# Patient Record
Sex: FEMALE | Race: WHITE | Employment: FULL TIME | URBAN - METROPOLITAN AREA
[De-identification: names, ages, dates, MRNs, and addresses within clinical notes are randomized per-mention and may not be internally consistent; named-entity substitution may affect disease eponyms.]

---

## 2018-11-21 ENCOUNTER — OFFICE VISIT (OUTPATIENT)
Dept: RHEUMATOLOGY | Facility: CLINIC | Age: 23
End: 2018-11-21
Payer: COMMERCIAL

## 2018-11-21 VITALS
DIASTOLIC BLOOD PRESSURE: 93 MMHG | WEIGHT: 199.63 LBS | BODY MASS INDEX: 34.08 KG/M2 | HEIGHT: 64 IN | HEART RATE: 82 BPM | SYSTOLIC BLOOD PRESSURE: 139 MMHG

## 2018-11-21 DIAGNOSIS — M25.50 POLYARTHRALGIA: Primary | ICD-10-CM

## 2018-11-21 PROCEDURE — 99212 OFFICE O/P EST SF 10 MIN: CPT | Performed by: INTERNAL MEDICINE

## 2018-11-21 PROCEDURE — 99244 OFF/OP CNSLTJ NEW/EST MOD 40: CPT | Performed by: INTERNAL MEDICINE

## 2018-11-21 RX ORDER — ETHYNODIOL DIACETATE AND ETHINYL ESTRADIOL 1 MG-35MCG
1 KIT ORAL DAILY
Refills: 1 | COMMUNITY
Start: 2018-09-11

## 2018-11-21 NOTE — PROGRESS NOTES
Juan Luis Marino is a 26year old female who presents for Patient presents with:  Fibromyalgia Syndrome  Joint Pain: knees, hips, elbows, shoulders, hands  Swelling: swelling of hands in the morning  .    HPI:     I had the pleasure of seeing Juan Luis Marino on 11 pain and r/o RA which was r/o. Blood work was negative. Treated with cymbalta 30 mg daily for 1 year initially felt some improvement with sleep and energy but then stopped working. Take advil and tylenol as needed now.      Denies any fever chills, weight l lesions. No lymphadenopathy. No facial rash  CVS: RRR, no murmurs rubs or gallops. Equal 2+ distal pulses. LUNGS: CTAB, no increased work of breathing  ABDOMEN:  soft NT/ND, +BS, no HSM  SKIN: No rashes or skin lesions.  No nail findings  MSK:  Cervical s follow-up in 2 weeks    Ariela Weston MD  11/21/2018  5:39 PM

## 2019-05-08 PROBLEM — M25.551 RIGHT HIP PAIN: Status: ACTIVE | Noted: 2019-05-08

## 2019-08-31 PROCEDURE — 80307 DRUG TEST PRSMV CHEM ANLYZR: CPT | Performed by: INTERNAL MEDICINE

## 2019-08-31 PROCEDURE — 86480 TB TEST CELL IMMUN MEASURE: CPT | Performed by: INTERNAL MEDICINE

## 2020-08-07 PROBLEM — M79.7 FIBROMYALGIA: Status: ACTIVE | Noted: 2020-08-07

## 2020-08-07 PROBLEM — E55.9 VITAMIN D DEFICIENCY: Status: ACTIVE | Noted: 2020-08-07

## 2021-06-18 ENCOUNTER — HOSPITAL ENCOUNTER (OUTPATIENT)
Dept: GENERAL RADIOLOGY | Facility: HOSPITAL | Age: 26
Discharge: HOME | End: 2021-06-18
Attending: PHYSICIAN ASSISTANT
Payer: COMMERCIAL

## 2021-06-18 DIAGNOSIS — M25.551 RIGHT HIP PAIN: ICD-10-CM

## 2021-06-18 PROCEDURE — 73502 X-RAY EXAM HIP UNI 2-3 VIEWS: CPT | Performed by: PHYSICIAN ASSISTANT

## 2022-12-20 ENCOUNTER — LAB ENCOUNTER (OUTPATIENT)
Dept: LAB | Age: 27
End: 2022-12-20
Attending: INTERNAL MEDICINE
Payer: COMMERCIAL

## 2022-12-20 ENCOUNTER — OFFICE VISIT (OUTPATIENT)
Dept: INTERNAL MEDICINE CLINIC | Facility: CLINIC | Age: 27
End: 2022-12-20
Payer: COMMERCIAL

## 2022-12-20 VITALS
DIASTOLIC BLOOD PRESSURE: 84 MMHG | SYSTOLIC BLOOD PRESSURE: 131 MMHG | HEART RATE: 91 BPM | BODY MASS INDEX: 32.44 KG/M2 | HEIGHT: 64 IN | WEIGHT: 190 LBS

## 2022-12-20 DIAGNOSIS — E55.9 VITAMIN D DEFICIENCY: ICD-10-CM

## 2022-12-20 DIAGNOSIS — Z13.0 SCREENING FOR DEFICIENCY ANEMIA: ICD-10-CM

## 2022-12-20 DIAGNOSIS — Z13.220 LIPID SCREENING: ICD-10-CM

## 2022-12-20 DIAGNOSIS — K22.4 ESOPHAGEAL SPASM: ICD-10-CM

## 2022-12-20 DIAGNOSIS — Z00.00 WELLNESS EXAMINATION: Primary | ICD-10-CM

## 2022-12-20 DIAGNOSIS — Z13.29 THYROID DISORDER SCREEN: ICD-10-CM

## 2022-12-20 DIAGNOSIS — Z12.4 CERVICAL CANCER SCREENING: ICD-10-CM

## 2022-12-20 DIAGNOSIS — R73.09 ELEVATED GLUCOSE: ICD-10-CM

## 2022-12-20 DIAGNOSIS — Z13.21 ENCOUNTER FOR VITAMIN DEFICIENCY SCREENING: ICD-10-CM

## 2022-12-20 DIAGNOSIS — N92.0 SPOTTING: ICD-10-CM

## 2022-12-20 LAB
ALBUMIN SERPL-MCNC: 3.6 G/DL (ref 3.4–5)
ALBUMIN/GLOB SERPL: 1 {RATIO} (ref 1–2)
ALP LIVER SERPL-CCNC: 42 U/L
ALT SERPL-CCNC: 19 U/L
ANION GAP SERPL CALC-SCNC: 6 MMOL/L (ref 0–18)
APTT PPP: 26.5 SECONDS (ref 23.3–35.6)
AST SERPL-CCNC: 8 U/L (ref 15–37)
BASOPHILS # BLD AUTO: 0.06 X10(3) UL (ref 0–0.2)
BASOPHILS NFR BLD AUTO: 1 %
BILIRUB SERPL-MCNC: 1 MG/DL (ref 0.1–2)
BUN BLD-MCNC: 11 MG/DL (ref 7–18)
BUN/CREAT SERPL: 13.3 (ref 10–20)
CALCIUM BLD-MCNC: 9 MG/DL (ref 8.5–10.1)
CHLORIDE SERPL-SCNC: 109 MMOL/L (ref 98–112)
CHOLEST SERPL-MCNC: 174 MG/DL (ref ?–200)
CO2 SERPL-SCNC: 26 MMOL/L (ref 21–32)
CREAT BLD-MCNC: 0.83 MG/DL
DEPRECATED RDW RBC AUTO: 38.7 FL (ref 35.1–46.3)
EOSINOPHIL # BLD AUTO: 0.07 X10(3) UL (ref 0–0.7)
EOSINOPHIL NFR BLD AUTO: 1.2 %
ERYTHROCYTE [DISTWIDTH] IN BLOOD BY AUTOMATED COUNT: 11.5 % (ref 11–15)
EST. AVERAGE GLUCOSE BLD GHB EST-MCNC: 91 MG/DL (ref 68–126)
FASTING PATIENT LIPID ANSWER: YES
FASTING STATUS PATIENT QL REPORTED: YES
GFR SERPLBLD BASED ON 1.73 SQ M-ARVRAT: 100 ML/MIN/1.73M2 (ref 60–?)
GLOBULIN PLAS-MCNC: 3.5 G/DL (ref 2.8–4.4)
GLUCOSE BLD-MCNC: 88 MG/DL (ref 70–99)
HBA1C MFR BLD: 4.8 % (ref ?–5.7)
HCT VFR BLD AUTO: 43.1 %
HCV AB SERPL QL IA: NONREACTIVE
HDLC SERPL-MCNC: 51 MG/DL (ref 40–59)
HGB BLD-MCNC: 14.6 G/DL
IMM GRANULOCYTES # BLD AUTO: 0.02 X10(3) UL (ref 0–1)
IMM GRANULOCYTES NFR BLD: 0.3 %
INR BLD: 0.99 (ref 0.85–1.16)
LDLC SERPL CALC-MCNC: 97 MG/DL (ref ?–100)
LYMPHOCYTES # BLD AUTO: 1.89 X10(3) UL (ref 1–4)
LYMPHOCYTES NFR BLD AUTO: 31.1 %
MCH RBC QN AUTO: 31.2 PG (ref 26–34)
MCHC RBC AUTO-ENTMCNC: 33.9 G/DL (ref 31–37)
MCV RBC AUTO: 92.1 FL
MONOCYTES # BLD AUTO: 0.39 X10(3) UL (ref 0.1–1)
MONOCYTES NFR BLD AUTO: 6.4 %
NEUTROPHILS # BLD AUTO: 3.65 X10 (3) UL (ref 1.5–7.7)
NEUTROPHILS # BLD AUTO: 3.65 X10(3) UL (ref 1.5–7.7)
NEUTROPHILS NFR BLD AUTO: 60 %
NONHDLC SERPL-MCNC: 123 MG/DL (ref ?–130)
OSMOLALITY SERPL CALC.SUM OF ELEC: 291 MOSM/KG (ref 275–295)
PLATELET # BLD AUTO: 251 10(3)UL (ref 150–450)
POTASSIUM SERPL-SCNC: 3.8 MMOL/L (ref 3.5–5.1)
PROT SERPL-MCNC: 7.1 G/DL (ref 6.4–8.2)
PROTHROMBIN TIME: 13 SECONDS (ref 11.6–14.8)
RBC # BLD AUTO: 4.68 X10(6)UL
SODIUM SERPL-SCNC: 141 MMOL/L (ref 136–145)
TRIGL SERPL-MCNC: 152 MG/DL (ref 30–149)
TSI SER-ACNC: 1.74 MIU/ML (ref 0.36–3.74)
VIT D+METAB SERPL-MCNC: 42.4 NG/ML (ref 30–100)
VLDLC SERPL CALC-MCNC: 25 MG/DL (ref 0–30)
WBC # BLD AUTO: 6.1 X10(3) UL (ref 4–11)

## 2022-12-20 PROCEDURE — 3079F DIAST BP 80-89 MM HG: CPT | Performed by: INTERNAL MEDICINE

## 2022-12-20 PROCEDURE — 36415 COLL VENOUS BLD VENIPUNCTURE: CPT | Performed by: INTERNAL MEDICINE

## 2022-12-20 PROCEDURE — 86803 HEPATITIS C AB TEST: CPT | Performed by: INTERNAL MEDICINE

## 2022-12-20 PROCEDURE — 80053 COMPREHEN METABOLIC PANEL: CPT | Performed by: INTERNAL MEDICINE

## 2022-12-20 PROCEDURE — 3075F SYST BP GE 130 - 139MM HG: CPT | Performed by: INTERNAL MEDICINE

## 2022-12-20 PROCEDURE — 3008F BODY MASS INDEX DOCD: CPT | Performed by: INTERNAL MEDICINE

## 2022-12-20 PROCEDURE — 84443 ASSAY THYROID STIM HORMONE: CPT | Performed by: INTERNAL MEDICINE

## 2022-12-20 PROCEDURE — 83036 HEMOGLOBIN GLYCOSYLATED A1C: CPT | Performed by: INTERNAL MEDICINE

## 2022-12-20 PROCEDURE — 80061 LIPID PANEL: CPT | Performed by: INTERNAL MEDICINE

## 2022-12-20 PROCEDURE — 99214 OFFICE O/P EST MOD 30 MIN: CPT | Performed by: INTERNAL MEDICINE

## 2022-12-20 PROCEDURE — 82306 VITAMIN D 25 HYDROXY: CPT | Performed by: INTERNAL MEDICINE

## 2022-12-20 PROCEDURE — 99385 PREV VISIT NEW AGE 18-39: CPT | Performed by: INTERNAL MEDICINE

## 2022-12-20 PROCEDURE — 85025 COMPLETE CBC W/AUTO DIFF WBC: CPT | Performed by: INTERNAL MEDICINE

## 2022-12-20 PROCEDURE — 85610 PROTHROMBIN TIME: CPT | Performed by: INTERNAL MEDICINE

## 2022-12-20 PROCEDURE — 85730 THROMBOPLASTIN TIME PARTIAL: CPT | Performed by: INTERNAL MEDICINE

## 2022-12-20 RX ORDER — NITROGLYCERIN 0.4 MG/1
0.4 TABLET SUBLINGUAL EVERY 5 MIN PRN
Qty: 30 TABLET | Refills: 1 | Status: SHIPPED | OUTPATIENT
Start: 2022-12-20

## 2023-01-13 RX ORDER — ETHYNODIOL DIACETATE AND ETHINYL ESTRADIOL 1 MG-35MCG
1 KIT ORAL DAILY
Qty: 28 TABLET | Refills: 1 | Status: SHIPPED | OUTPATIENT
Start: 2023-01-13

## 2023-03-10 NOTE — TELEPHONE ENCOUNTER
Please review. Protocol failed / No protocol. Requested Prescriptions   Pending Prescriptions Disp Refills    ETHYNODIOL DIAC-ETH ESTRADIOL 1-35 MG-MCG Oral Tab [Pharmacy Med Name: Gay Rocha EST 1/35 TABLETS 28S] 28 tablet 1     Sig: Take 1 tablet by mouth daily.        Gynecology Medication Protocol Failed - 3/9/2023 10:26 PM        Failed - Pass dependent on manual look-up of last PAP and patient compliance with PAP follow up recommendations        Passed - In person appointment or virtual visit in the past 12 mos or appointment in next 3 mos     Recent Outpatient Visits              2 months ago Wellness examination    La Solano MD    Office Visit    11 months ago Acute bacterial sinusitis    Internal Medicine - Cecelia Epps MD    Office Visit    1 year ago Left hip impingement syndrome    Sharla Loza MD    Office Visit    1 year ago Left hip impingement syndrome    Orthopaedics - Sara Dubois MD    Office Visit    1 year ago Costochondritis    Internal Medicine - Silas Don MD    Office Visit          Future Appointments         Provider Department Appt Notes    In 2 months Lissa Krueger MD [address] [facility],[address], [address] [place] [address], [address] [place] [address] Birth control                    Recent Outpatient Visits              2 months ago Wellness examination    La Solano MD    Office Visit    11 months ago Acute bacterial sinusitis    Internal Medicine - Erin Nelson MD    Office Visit    1 year ago Left hip impingement syndrome    Lizette Santos, Marsha Rodriguez MD    Office Visit    1 year ago Left hip impingement syndrome    [facility] - Sara Dubois MD    Office Visit    1 year ago Costochondritis    Internal Medicine - Smiley Carballo MD    Office Visit            Future Appointments         Provider Department Appt Notes    In 2 months [place] MD Fred[facility] [address], [facility], [address] [place] [address] [facility]

## 2023-03-14 RX ORDER — ETHYNODIOL DIACETATE AND ETHINYL ESTRADIOL 1 MG-35MCG
1 KIT ORAL DAILY
Qty: 90 TABLET | Refills: 3 | Status: SHIPPED
Start: 2023-03-14

## 2023-04-06 ENCOUNTER — TELEPHONE (OUTPATIENT)
Dept: INTERNAL MEDICINE CLINIC | Facility: CLINIC | Age: 28
End: 2023-04-06

## 2023-06-05 ENCOUNTER — NURSE TRIAGE (OUTPATIENT)
Dept: INTERNAL MEDICINE CLINIC | Facility: CLINIC | Age: 28
End: 2023-06-05

## 2023-06-05 ENCOUNTER — HOSPITAL ENCOUNTER (EMERGENCY)
Age: 28
Discharge: HOME | End: 2023-06-05
Attending: EMERGENCY MEDICINE

## 2023-06-05 ENCOUNTER — APPOINTMENT (OUTPATIENT)
Dept: GENERAL RADIOLOGY | Age: 28
End: 2023-06-05
Attending: PHYSICIAN ASSISTANT

## 2023-06-05 VITALS
SYSTOLIC BLOOD PRESSURE: 133 MMHG | RESPIRATION RATE: 14 BRPM | TEMPERATURE: 99.1 F | DIASTOLIC BLOOD PRESSURE: 95 MMHG | OXYGEN SATURATION: 98 % | BODY MASS INDEX: 32.62 KG/M2 | WEIGHT: 195.77 LBS | HEIGHT: 65 IN | HEART RATE: 66 BPM

## 2023-06-05 DIAGNOSIS — R07.9 CHEST PAIN, UNSPECIFIED TYPE: Primary | ICD-10-CM

## 2023-06-05 LAB
ALBUMIN SERPL-MCNC: 3.7 G/DL (ref 3.6–5.1)
ALBUMIN/GLOB SERPL: 1.1 {RATIO} (ref 1–2.4)
ALP SERPL-CCNC: 52 UNITS/L (ref 45–117)
ALT SERPL-CCNC: 29 UNITS/L
ANION GAP SERPL CALC-SCNC: 9 MMOL/L (ref 7–19)
AST SERPL-CCNC: 16 UNITS/L
BASOPHILS # BLD: 0.1 K/MCL (ref 0–0.3)
BASOPHILS NFR BLD: 1 %
BILIRUB SERPL-MCNC: 0.7 MG/DL (ref 0.2–1)
BUN SERPL-MCNC: 12 MG/DL (ref 6–20)
BUN/CREAT SERPL: 15 (ref 7–25)
CALCIUM SERPL-MCNC: 8.8 MG/DL (ref 8.4–10.2)
CHLORIDE SERPL-SCNC: 110 MMOL/L (ref 97–110)
CO2 SERPL-SCNC: 26 MMOL/L (ref 21–32)
CREAT SERPL-MCNC: 0.81 MG/DL (ref 0.51–0.95)
D DIMER PPP FEU-MCNC: <0.19 MG/L (FEU)
DEPRECATED RDW RBC: 39.3 FL (ref 39–50)
EOSINOPHIL # BLD: 0.1 K/MCL (ref 0–0.5)
EOSINOPHIL NFR BLD: 1 %
ERYTHROCYTE [DISTWIDTH] IN BLOOD: 11.6 % (ref 11–15)
FASTING DURATION TIME PATIENT: ABNORMAL H
GFR SERPLBLD BASED ON 1.73 SQ M-ARVRAT: >90 ML/MIN
GLOBULIN SER-MCNC: 3.4 G/DL (ref 2–4)
GLUCOSE SERPL-MCNC: 103 MG/DL (ref 70–99)
HCG UR QL: NEGATIVE
HCT VFR BLD CALC: 43.5 % (ref 36–46.5)
HGB BLD-MCNC: 14.7 G/DL (ref 12–15.5)
IMM GRANULOCYTES # BLD AUTO: 0 K/MCL (ref 0–0.2)
IMM GRANULOCYTES # BLD: 0 %
LYMPHOCYTES # BLD: 2.1 K/MCL (ref 1–4.8)
LYMPHOCYTES NFR BLD: 30 %
MAGNESIUM SERPL-MCNC: 1.9 MG/DL (ref 1.7–2.4)
MCH RBC QN AUTO: 31.2 PG (ref 26–34)
MCHC RBC AUTO-ENTMCNC: 33.8 G/DL (ref 32–36.5)
MCV RBC AUTO: 92.4 FL (ref 78–100)
MONOCYTES # BLD: 0.4 K/MCL (ref 0.3–0.9)
MONOCYTES NFR BLD: 5 %
NEUTROPHILS # BLD: 4.5 K/MCL (ref 1.8–7.7)
NEUTROPHILS NFR BLD: 63 %
NRBC BLD MANUAL-RTO: 0 /100 WBC
PLATELET # BLD AUTO: 247 K/MCL (ref 140–450)
POTASSIUM SERPL-SCNC: 3.7 MMOL/L (ref 3.4–5.1)
PROT SERPL-MCNC: 7.1 G/DL (ref 6.4–8.2)
RAINBOW EXTRA TUBES HOLD SPECIMEN: NORMAL
RBC # BLD: 4.71 MIL/MCL (ref 4–5.2)
SODIUM SERPL-SCNC: 141 MMOL/L (ref 135–145)
TROPONIN I SERPL DL<=0.01 NG/ML-MCNC: <4 NG/L
TSH SERPL-ACNC: 2.52 MCUNITS/ML (ref 0.35–5)
WBC # BLD: 7.1 K/MCL (ref 4.2–11)

## 2023-06-05 PROCEDURE — 85379 FIBRIN DEGRADATION QUANT: CPT | Performed by: EMERGENCY MEDICINE

## 2023-06-05 PROCEDURE — 84443 ASSAY THYROID STIM HORMONE: CPT | Performed by: PHYSICIAN ASSISTANT

## 2023-06-05 PROCEDURE — 85025 COMPLETE CBC W/AUTO DIFF WBC: CPT | Performed by: PHYSICIAN ASSISTANT

## 2023-06-05 PROCEDURE — 83735 ASSAY OF MAGNESIUM: CPT | Performed by: PHYSICIAN ASSISTANT

## 2023-06-05 PROCEDURE — 84484 ASSAY OF TROPONIN QUANT: CPT | Performed by: EMERGENCY MEDICINE

## 2023-06-05 PROCEDURE — 71046 X-RAY EXAM CHEST 2 VIEWS: CPT

## 2023-06-05 PROCEDURE — 80053 COMPREHEN METABOLIC PANEL: CPT | Performed by: PHYSICIAN ASSISTANT

## 2023-06-05 PROCEDURE — 84703 CHORIONIC GONADOTROPIN ASSAY: CPT

## 2023-06-05 PROCEDURE — 96360 HYDRATION IV INFUSION INIT: CPT

## 2023-06-05 PROCEDURE — 96361 HYDRATE IV INFUSION ADD-ON: CPT

## 2023-06-05 PROCEDURE — 99285 EMERGENCY DEPT VISIT HI MDM: CPT

## 2023-06-05 PROCEDURE — 93005 ELECTROCARDIOGRAM TRACING: CPT | Performed by: PHYSICIAN ASSISTANT

## 2023-06-05 PROCEDURE — 10002807 HB RX 258: Performed by: PHYSICIAN ASSISTANT

## 2023-06-05 RX ORDER — ETHYNODIOL DIACETATE AND ETHINYL ESTRADIOL 1 MG-35MCG
1 KIT ORAL DAILY
COMMUNITY
Start: 2023-03-14

## 2023-06-05 RX ORDER — FOLIC ACID 0.8 MG
TABLET ORAL
COMMUNITY

## 2023-06-05 RX ORDER — NITROGLYCERIN 0.4 MG/1
0.4 TABLET SUBLINGUAL
COMMUNITY
Start: 2022-12-20

## 2023-06-05 RX ADMIN — SODIUM CHLORIDE 1000 ML: 9 INJECTION, SOLUTION INTRAVENOUS at 11:08

## 2023-06-05 ASSESSMENT — HEART SCORE
RISK FACTORS: 1-2 RISK FACTORS
EKG: NORMAL
HEART SCORE: 1
HISTORY: SLIGHTLY SUSPICIOUS
AGE: LESS THAN OR EQUAL TO 45
TROPONIN: EQUAL OR LESS THAN NORMAL LIMIT

## 2023-06-06 ENCOUNTER — MED REC SCAN ONLY (OUTPATIENT)
Dept: INTERNAL MEDICINE CLINIC | Facility: CLINIC | Age: 28
End: 2023-06-06

## 2023-06-06 ENCOUNTER — OFFICE VISIT (OUTPATIENT)
Dept: CARDIOLOGY | Age: 28
End: 2023-06-06

## 2023-06-06 VITALS
SYSTOLIC BLOOD PRESSURE: 128 MMHG | DIASTOLIC BLOOD PRESSURE: 87 MMHG | HEART RATE: 87 BPM | WEIGHT: 194.45 LBS | BODY MASS INDEX: 32.4 KG/M2 | HEIGHT: 65 IN

## 2023-06-06 DIAGNOSIS — R07.9 CHEST PAIN, UNSPECIFIED TYPE: Primary | ICD-10-CM

## 2023-06-06 LAB
ATRIAL RATE (BPM): 112
P AXIS (DEGREES): 70
PR-INTERVAL (MSEC): 106
QRS-INTERVAL (MSEC): 80
QT-INTERVAL (MSEC): 324
QTC: 442
R AXIS (DEGREES): 34
REPORT TEXT: NORMAL
T AXIS (DEGREES): 34
VENTRICULAR RATE EKG/MIN (BPM): 112

## 2023-06-06 PROCEDURE — 99204 OFFICE O/P NEW MOD 45 MIN: CPT | Performed by: INTERNAL MEDICINE

## 2023-06-06 RX ORDER — METOPROLOL TARTRATE 100 MG/1
100 TABLET ORAL SEE ADMIN INSTRUCTIONS
Qty: 2 TABLET | Refills: 0 | Status: SHIPPED | OUTPATIENT
Start: 2023-06-06

## 2023-06-06 SDOH — HEALTH STABILITY: PHYSICAL HEALTH: ON AVERAGE, HOW MANY DAYS PER WEEK DO YOU ENGAGE IN MODERATE TO STRENUOUS EXERCISE (LIKE A BRISK WALK)?: 0 DAYS

## 2023-06-06 SDOH — HEALTH STABILITY: PHYSICAL HEALTH: ON AVERAGE, HOW MANY MINUTES DO YOU ENGAGE IN EXERCISE AT THIS LEVEL?: 0 MIN

## 2023-06-06 ASSESSMENT — PATIENT HEALTH QUESTIONNAIRE - PHQ9
CLINICAL INTERPRETATION OF PHQ2 SCORE: NO FURTHER SCREENING NEEDED
SUM OF ALL RESPONSES TO PHQ9 QUESTIONS 1 AND 2: 0
1. LITTLE INTEREST OR PLEASURE IN DOING THINGS: NOT AT ALL
SUM OF ALL RESPONSES TO PHQ9 QUESTIONS 1 AND 2: 0
2. FEELING DOWN, DEPRESSED OR HOPELESS: NOT AT ALL

## 2023-06-07 ENCOUNTER — TELEPHONE (OUTPATIENT)
Dept: CT IMAGING | Age: 28
End: 2023-06-07

## 2023-06-14 ENCOUNTER — ANCILLARY PROCEDURE (OUTPATIENT)
Dept: CARDIOLOGY | Age: 28
End: 2023-06-14
Attending: INTERNAL MEDICINE

## 2023-06-14 DIAGNOSIS — R07.9 CHEST PAIN, UNSPECIFIED TYPE: ICD-10-CM

## 2023-06-14 LAB
AORTIC VALVE AREA (AVA): 0.53
ASCENDING AORTA (AAD): 3
AV PEAK GRADIENT (AVPG): 7
AV PEAK VELOCITY (AVPV): 1.32
AV STENOSIS SEVERITY TEXT: NORMAL
AVI LVOT PEAK GRADIENT (LVOTMG): 0.8
E WAVE DECELARATION TIME (MDT): 5.79
INTERVENTRICULAR SEPTUM IN END DIASTOLE (IVSD): 2.17
LEFT INTERNAL DIMENSION IN SYSTOLE (LVSD): 0.9
LEFT VENTRICULAR INTERNAL DIMENSION IN DIASTOLE (LVDD): 2.9
LEFT VENTRICULAR POSTERIOR WALL IN END DIASTOLE (LVPW): 4.3
LV EF: NORMAL %
LVOT 2D (LVOTD): 19.4
LVOT VTI (LVOTVTI): 0.9
MV E TISSUE VEL MED (MESV): 14.1
MV E WAVE VEL/E TISSUE VEL MED(MSR): 9.17
MV PEAK A VELOCITY (MVPAV): 198
MV PEAK E VELOCITY (MVPEV): 0.48
RV END SYSTOLIC LONGITUDINAL STRAIN FREE WALL (RVGS): 2
TRICUSPID VALVE ANNULAR PEAK VELOCITY (TVAPV): 22
TRICUSPID VALVE PEAK REGURGITATION VELOCITY (TRPV): 2.9
TV ESTIMATED RIGHT ARTERIAL PRESSURE (RAP): 11.7

## 2023-06-14 PROCEDURE — 93306 TTE W/DOPPLER COMPLETE: CPT | Performed by: INTERNAL MEDICINE

## 2023-06-27 ENCOUNTER — TELEPHONE (OUTPATIENT)
Dept: CT IMAGING | Age: 28
End: 2023-06-27

## 2023-06-28 ENCOUNTER — HOSPITAL ENCOUNTER (OUTPATIENT)
Dept: CT IMAGING | Age: 28
Discharge: HOME | End: 2023-06-28
Attending: INTERNAL MEDICINE

## 2023-06-28 VITALS — SYSTOLIC BLOOD PRESSURE: 120 MMHG | HEART RATE: 60 BPM | DIASTOLIC BLOOD PRESSURE: 60 MMHG

## 2023-06-28 DIAGNOSIS — R07.9 CHEST PAIN, UNSPECIFIED TYPE: ICD-10-CM

## 2023-06-28 PROCEDURE — 10002803 HB RX 637: Performed by: INTERNAL MEDICINE

## 2023-06-28 PROCEDURE — 75574 CT ANGIO HRT W/3D IMAGE: CPT

## 2023-06-28 PROCEDURE — 75574 CT ANGIO HRT W/3D IMAGE: CPT | Performed by: INTERNAL MEDICINE

## 2023-06-28 PROCEDURE — G1004 CDSM NDSC: HCPCS

## 2023-06-28 PROCEDURE — 10002805 HB CONTRAST AGENT: Performed by: INTERNAL MEDICINE

## 2023-06-28 PROCEDURE — G1004 CDSM NDSC: HCPCS | Performed by: INTERNAL MEDICINE

## 2023-06-28 RX ORDER — NITROGLYCERIN 0.4 MG/1
TABLET SUBLINGUAL PRN
Status: COMPLETED | OUTPATIENT
Start: 2023-06-28 | End: 2023-06-28

## 2023-06-28 RX ADMIN — IOHEXOL 80 ML: 350 INJECTION, SOLUTION INTRAVENOUS at 10:23

## 2023-06-28 RX ADMIN — NITROGLYCERIN 0.4 MG: 0.4 TABLET SUBLINGUAL at 10:16

## 2023-08-11 ENCOUNTER — APPOINTMENT (OUTPATIENT)
Dept: CARDIOLOGY | Age: 28
End: 2023-08-11

## 2023-09-21 ENCOUNTER — E-ADVICE (OUTPATIENT)
Dept: CARDIOLOGY | Age: 28
End: 2023-09-21

## 2023-11-07 ENCOUNTER — PATIENT MESSAGE (OUTPATIENT)
Dept: INTERNAL MEDICINE CLINIC | Facility: CLINIC | Age: 28
End: 2023-11-07

## 2023-11-07 ENCOUNTER — TELEPHONE (OUTPATIENT)
Dept: INTERNAL MEDICINE CLINIC | Facility: CLINIC | Age: 28
End: 2023-11-07

## 2023-11-07 NOTE — TELEPHONE ENCOUNTER
Pt stated that when she last saw  Romana Morton she had mentioned to her about her symptoms but they are still there and she is not sure if its due to her Fibromyalgia or if it can be something else. She also mentioned that she continues to have hip pain and she did see the ortho. Pt is having wide spread pain, headaches, and overall feeling off . I asked pt if she has done a covid test. Pt stated that she is not feeling that kind of symptoms. Pt wanted to know if she would be able to see Dr. Rosa Valentin sooner then the Nov 16 appt she did on line. Pt was inform she is out of the office but I can get her in to see her partner today or tomorrow. Pt stated that she is correctly at work but she can come in tomorrow. Pt was given a appt for tomorrow since she can not come in today.      Future Appointments   Date Time Provider Javi Farooq   11/8/2023  3:00 PM CODY Lu

## 2023-11-08 ENCOUNTER — LAB ENCOUNTER (OUTPATIENT)
Dept: LAB | Age: 28
End: 2023-11-08
Payer: COMMERCIAL

## 2023-11-08 ENCOUNTER — OFFICE VISIT (OUTPATIENT)
Dept: INTERNAL MEDICINE CLINIC | Facility: CLINIC | Age: 28
End: 2023-11-08
Payer: COMMERCIAL

## 2023-11-08 VITALS
HEIGHT: 64 IN | BODY MASS INDEX: 34.15 KG/M2 | WEIGHT: 200 LBS | DIASTOLIC BLOOD PRESSURE: 90 MMHG | OXYGEN SATURATION: 98 % | HEART RATE: 74 BPM | SYSTOLIC BLOOD PRESSURE: 140 MMHG

## 2023-11-08 DIAGNOSIS — M79.7 FIBROMYALGIA: ICD-10-CM

## 2023-11-08 DIAGNOSIS — R53.83 FATIGUE, UNSPECIFIED TYPE: ICD-10-CM

## 2023-11-08 DIAGNOSIS — M79.7 FIBROMYALGIA: Primary | ICD-10-CM

## 2023-11-08 LAB
CK SERPL-CCNC: 90 U/L
CRP SERPL-MCNC: <0.4 MG/DL (ref ?–1)
ERYTHROCYTE [SEDIMENTATION RATE] IN BLOOD: 5 MM/HR
RHEUMATOID FACT SERPL-ACNC: <10 IU/ML (ref ?–14)

## 2023-11-08 PROCEDURE — 3080F DIAST BP >= 90 MM HG: CPT

## 2023-11-08 PROCEDURE — 86225 DNA ANTIBODY NATIVE: CPT

## 2023-11-08 PROCEDURE — 36415 COLL VENOUS BLD VENIPUNCTURE: CPT

## 2023-11-08 PROCEDURE — 85652 RBC SED RATE AUTOMATED: CPT

## 2023-11-08 PROCEDURE — 3077F SYST BP >= 140 MM HG: CPT

## 2023-11-08 PROCEDURE — 3008F BODY MASS INDEX DOCD: CPT

## 2023-11-08 PROCEDURE — 86038 ANTINUCLEAR ANTIBODIES: CPT

## 2023-11-08 PROCEDURE — 86431 RHEUMATOID FACTOR QUANT: CPT

## 2023-11-08 PROCEDURE — 99214 OFFICE O/P EST MOD 30 MIN: CPT

## 2023-11-08 PROCEDURE — 86140 C-REACTIVE PROTEIN: CPT

## 2023-11-08 PROCEDURE — 82550 ASSAY OF CK (CPK): CPT

## 2023-11-08 RX ORDER — AMITRIPTYLINE HYDROCHLORIDE 10 MG/1
10 TABLET, FILM COATED ORAL NIGHTLY
Qty: 30 TABLET | Refills: 0 | Status: SHIPPED | OUTPATIENT
Start: 2023-11-08

## 2023-11-08 RX ORDER — FOLIC ACID 0.8 MG
TABLET ORAL
COMMUNITY

## 2023-11-09 LAB
DSDNA IGG SERPL IA-ACNC: 3.3 IU/ML
ENA AB SER QL IA: 0.2 UG/L
ENA AB SER QL IA: NEGATIVE

## 2023-12-12 ENCOUNTER — OFFICE VISIT (OUTPATIENT)
Dept: INTERNAL MEDICINE CLINIC | Facility: CLINIC | Age: 28
End: 2023-12-12
Payer: COMMERCIAL

## 2023-12-12 VITALS
OXYGEN SATURATION: 99 % | WEIGHT: 201 LBS | HEART RATE: 66 BPM | DIASTOLIC BLOOD PRESSURE: 84 MMHG | SYSTOLIC BLOOD PRESSURE: 137 MMHG | HEIGHT: 64 IN | BODY MASS INDEX: 34.31 KG/M2

## 2023-12-12 DIAGNOSIS — Z01.818 PREOPERATIVE CLEARANCE: Primary | ICD-10-CM

## 2023-12-12 DIAGNOSIS — Z23 NEED FOR DIPHTHERIA-TETANUS-PERTUSSIS (TDAP) VACCINE: ICD-10-CM

## 2023-12-12 PROCEDURE — 3079F DIAST BP 80-89 MM HG: CPT | Performed by: INTERNAL MEDICINE

## 2023-12-12 PROCEDURE — 3008F BODY MASS INDEX DOCD: CPT | Performed by: INTERNAL MEDICINE

## 2023-12-12 PROCEDURE — 99214 OFFICE O/P EST MOD 30 MIN: CPT | Performed by: INTERNAL MEDICINE

## 2023-12-12 PROCEDURE — 90471 IMMUNIZATION ADMIN: CPT | Performed by: INTERNAL MEDICINE

## 2023-12-12 PROCEDURE — 90715 TDAP VACCINE 7 YRS/> IM: CPT | Performed by: INTERNAL MEDICINE

## 2023-12-12 PROCEDURE — 3075F SYST BP GE 130 - 139MM HG: CPT | Performed by: INTERNAL MEDICINE

## 2023-12-21 ENCOUNTER — PATIENT MESSAGE (OUTPATIENT)
Dept: INTERNAL MEDICINE CLINIC | Facility: CLINIC | Age: 28
End: 2023-12-21

## 2023-12-21 DIAGNOSIS — Z01.818 PREOPERATIVE CLEARANCE: Primary | ICD-10-CM

## 2023-12-21 NOTE — TELEPHONE ENCOUNTER
.   Was seen on 12/12/23 for pre op. Dr Marroquin=per attached documents on 12/13/23 MyChart, patient would need the PT/PTT/INR order as well. Pended for approval.         From: Opal Baltazar  To: Ollie Collet  Sent: 12/21/2023  8:57 AM CST  Subject: Lab orders    Hello I just want to check. To see if my labs have been ordered for my surgery clearance. I sent in the orders last week and I just want to make sure the new labs have been ordered. I want to get those done this weekend. If you could let me know everything is ordered.      Thank you   [place]

## 2023-12-22 NOTE — TELEPHONE ENCOUNTER
Patient is calling as she needs labs to be ordered so she can complete them for her pre-op. Reviewed the chart and labs were placed but to [facility] and she doesn't use [facility] she goes to [address] [place] [address] lab.  Re-ordered all labs and advised to fast

## 2023-12-23 ENCOUNTER — LAB ENCOUNTER (OUTPATIENT)
Dept: LAB | Facility: HOSPITAL | Age: 28
End: 2023-12-23
Attending: INTERNAL MEDICINE
Payer: COMMERCIAL

## 2023-12-23 DIAGNOSIS — Z01.818 PREOPERATIVE CLEARANCE: ICD-10-CM

## 2023-12-23 LAB
ALBUMIN SERPL-MCNC: 4.3 G/DL (ref 3.2–4.8)
ALBUMIN/GLOB SERPL: 1.7 {RATIO} (ref 1–2)
ALP LIVER SERPL-CCNC: 47 U/L
ALT SERPL-CCNC: 34 U/L
ANION GAP SERPL CALC-SCNC: 8 MMOL/L (ref 0–18)
APTT PPP: 24.9 SECONDS (ref 23.3–35.6)
AST SERPL-CCNC: 17 U/L (ref ?–34)
BASOPHILS # BLD AUTO: 0.07 X10(3) UL (ref 0–0.2)
BASOPHILS NFR BLD AUTO: 1.1 %
BILIRUB SERPL-MCNC: 1.3 MG/DL (ref 0.3–1.2)
BUN BLD-MCNC: 9 MG/DL (ref 9–23)
BUN/CREAT SERPL: 10.7 (ref 10–20)
CALCIUM BLD-MCNC: 9.2 MG/DL (ref 8.7–10.4)
CHLORIDE SERPL-SCNC: 105 MMOL/L (ref 98–112)
CO2 SERPL-SCNC: 26 MMOL/L (ref 21–32)
CREAT BLD-MCNC: 0.84 MG/DL
DEPRECATED RDW RBC AUTO: 37.1 FL (ref 35.1–46.3)
EGFRCR SERPLBLD CKD-EPI 2021: 98 ML/MIN/1.73M2 (ref 60–?)
EOSINOPHIL # BLD AUTO: 0.08 X10(3) UL (ref 0–0.7)
EOSINOPHIL NFR BLD AUTO: 1.3 %
ERYTHROCYTE [DISTWIDTH] IN BLOOD BY AUTOMATED COUNT: 11.4 % (ref 11–15)
FASTING STATUS PATIENT QL REPORTED: YES
GLOBULIN PLAS-MCNC: 2.6 G/DL (ref 2.8–4.4)
GLUCOSE BLD-MCNC: 90 MG/DL (ref 70–99)
HCT VFR BLD AUTO: 42.9 %
HGB BLD-MCNC: 14.7 G/DL
IMM GRANULOCYTES # BLD AUTO: 0.02 X10(3) UL (ref 0–1)
IMM GRANULOCYTES NFR BLD: 0.3 %
INR BLD: 0.99 (ref 0.8–1.2)
LYMPHOCYTES # BLD AUTO: 2.59 X10(3) UL (ref 1–4)
LYMPHOCYTES NFR BLD AUTO: 41.4 %
MCH RBC QN AUTO: 30.6 PG (ref 26–34)
MCHC RBC AUTO-ENTMCNC: 34.3 G/DL (ref 31–37)
MCV RBC AUTO: 89.2 FL
MONOCYTES # BLD AUTO: 0.42 X10(3) UL (ref 0.1–1)
MONOCYTES NFR BLD AUTO: 6.7 %
NEUTROPHILS # BLD AUTO: 3.07 X10 (3) UL (ref 1.5–7.7)
NEUTROPHILS # BLD AUTO: 3.07 X10(3) UL (ref 1.5–7.7)
NEUTROPHILS NFR BLD AUTO: 49.2 %
OSMOLALITY SERPL CALC.SUM OF ELEC: 286 MOSM/KG (ref 275–295)
PLATELET # BLD AUTO: 306 10(3)UL (ref 150–450)
POTASSIUM SERPL-SCNC: 4 MMOL/L (ref 3.5–5.1)
PROT SERPL-MCNC: 6.9 G/DL (ref 5.7–8.2)
PROTHROMBIN TIME: 13.7 SECONDS (ref 11.6–14.8)
RBC # BLD AUTO: 4.81 X10(6)UL
SODIUM SERPL-SCNC: 139 MMOL/L (ref 136–145)
WBC # BLD AUTO: 6.3 X10(3) UL (ref 4–11)

## 2023-12-23 PROCEDURE — 85610 PROTHROMBIN TIME: CPT | Performed by: INTERNAL MEDICINE

## 2023-12-23 PROCEDURE — 85730 THROMBOPLASTIN TIME PARTIAL: CPT | Performed by: INTERNAL MEDICINE

## 2023-12-23 PROCEDURE — 80053 COMPREHEN METABOLIC PANEL: CPT | Performed by: INTERNAL MEDICINE

## 2023-12-23 PROCEDURE — 36415 COLL VENOUS BLD VENIPUNCTURE: CPT | Performed by: INTERNAL MEDICINE

## 2023-12-23 PROCEDURE — 85025 COMPLETE CBC W/AUTO DIFF WBC: CPT | Performed by: INTERNAL MEDICINE

## 2024-05-04 ENCOUNTER — OFFICE VISIT (OUTPATIENT)
Dept: RHEUMATOLOGY | Facility: CLINIC | Age: 29
End: 2024-05-04
Payer: COMMERCIAL

## 2024-05-04 ENCOUNTER — LAB ENCOUNTER (OUTPATIENT)
Dept: LAB | Facility: HOSPITAL | Age: 29
End: 2024-05-04
Attending: INTERNAL MEDICINE
Payer: COMMERCIAL

## 2024-05-04 VITALS
SYSTOLIC BLOOD PRESSURE: 134 MMHG | HEART RATE: 90 BPM | DIASTOLIC BLOOD PRESSURE: 90 MMHG | BODY MASS INDEX: 34.83 KG/M2 | WEIGHT: 204 LBS | HEIGHT: 64 IN

## 2024-05-04 DIAGNOSIS — M25.50 POLYARTHRALGIA: ICD-10-CM

## 2024-05-04 DIAGNOSIS — M79.18 MYOFASCIAL PAIN: ICD-10-CM

## 2024-05-04 DIAGNOSIS — M79.18 MYOFASCIAL PAIN: Primary | ICD-10-CM

## 2024-05-04 PROCEDURE — 81374 HLA I TYPING 1 ANTIGEN LR: CPT

## 2024-05-04 PROCEDURE — 36415 COLL VENOUS BLD VENIPUNCTURE: CPT

## 2024-05-04 PROCEDURE — 99204 OFFICE O/P NEW MOD 45 MIN: CPT | Performed by: INTERNAL MEDICINE

## 2024-05-04 RX ORDER — GABAPENTIN 100 MG/1
100 CAPSULE ORAL NIGHTLY
Qty: 90 CAPSULE | Refills: 0 | Status: SHIPPED | OUTPATIENT
Start: 2024-05-04

## 2024-05-04 NOTE — PATIENT INSTRUCTIONS
You were seen today for diffuse joint pain, muscle pain, chest pain  Workup for autoimmune diseases with lupus, rheumatoid arthritis, inflammation, muscle diseases were negative  Symptoms are likely from fibromyalgia  Try gabapentin 100 mg at night.  Watch out for brain fog, dizziness while you take this medication  We can always increase this medication  Follow-up in the next 3 to 4 months

## 2024-05-04 NOTE — PROGRESS NOTES
Ruth Garcia is a 28 year old female who presents for   Chief Complaint   Patient presents with    Consult     Former patient and last seen in 2018     Fibromyalgia Syndrome   .   HPI:   CC: joint and muscle pain  Consult: referred by PCP Dr. Marroquin    This is a 27 yo F with hx of GERD referred for joint and muscle pain. She reports joint pain in the knees and elbows. Also has lower back pain. Hands can be stiff in the morning but not much pain. No swelling in the joints. She has eczema but not hx of psoriasis. Has diffuse muscle pain all over. Muscles can be tender to touch. At times has some chest pain intermittently, at times tender to touch but then its deep. Has had w/u done EKG, echocardiogram and CT chest and normal. Chest pain started around 2016. She states that she was diagnosed with possible esophageal spasms. At one time they thought she had pericarditis and she was placed on steroids which did help her symptoms.   She had a right lateral impingement surgery jan 8 and now in PT.  She was on Cymbalta in the past and helped in the beginning but then did not work.  Now taking advil as needed for pain. She was prescribed Elavil but has not started it.    Past medications:  Cymbalta- worked in the past but stopped working   Blood work:  Neg CTD screen, dsDNA  Neg ESR, CRP, CK, RF      Wt Readings from Last 2 Encounters:   05/04/24 204 lb (92.5 kg)   12/12/23 201 lb (91.2 kg)     Body mass index is 35.02 kg/m².      Current Outpatient Medications   Medication Sig Dispense Refill    Magnesium 500 MG Oral Cap Take by mouth.      Ethynodiol Diac-Eth Estradiol 1-35 MG-MCG Oral Tab Take 1 tablet by mouth daily. 90 tablet 3    triamcinolone acetonide 0.1 % External Ointment Apply thin layer to affected skin of bilateral arms nightly. Do not apply to face, underarms, groin or to normal skin. 80 g 0    amitriptyline 10 MG Oral Tab Take 1 tablet (10 mg total) by mouth nightly. (Patient not taking: Reported on  5/4/2024) 30 tablet 0    nitroglycerin 0.4 MG Sublingual SL Tab Place 1 tablet (0.4 mg total) under the tongue every 5 (five) minutes as needed for Chest pain. May repeat two times. If not better then head to ER (Patient not taking: Reported on 5/4/2024) 30 tablet 1      Past Medical History:    Fibromyalgia      No past surgical history on file.   Family History   Problem Relation Age of Onset    Hypertension Mother     Blood Cancer Maternal Grandfather       Social History:  Social History     Socioeconomic History    Marital status: Single   Tobacco Use    Smoking status: Never    Smokeless tobacco: Never   Substance and Sexual Activity    Alcohol use: No    Drug use: No     Social Determinants of Health      Received from [facility]    Housing Stability           REVIEW OF SYSTEMS:   Review Of Systems:  Constitutional: No fever, no change in weight or appetitie  Derm: No rashes, no oral ulcers, no alopecia, no photosensitivity, no psoriasis  HEENT: No dry eyes, no dry mouth, no Raynaud's, no nasal ulcers, no parotid swelling, no neck pain, no jaw pain, no temple pain  Eyes: No visual changes,   CVS: + chest pain, no heart disease  RS: No SOB, no Cough, No Pleurtic pain,   GI: No nausea, no vomiiting, no abominal pain, no hx of ulcer, no gastritis, no heartburn, no dyshpagia, no BRBPR or melena  : no dysuria, no hx of miscarriages, no DVT Hx, no hx of OCP,   Neuro: No numbness or tingling, no headache, no hx of seizures,   Psych: no hx of anxiety or depression  ENDO: no hx of thyroid disease, no hx of DM  Joint/Muscluskeltal: see HPI,   All other ROS are negative.     EXAM:   /90 (BP Location: Left arm, Patient Position: Sitting, Cuff Size: adult)   Pulse 90   Ht 5' 4\" (1.626 m)   Wt 204 lb (92.5 kg)   LMP  (LMP Unknown)   BMI 35.02 kg/m²   GEN: AAOx3, NAD  HEENT: EOMI, PERRLA, no injection or icterus, oral mucosa moist, no oral lesions. No lymphadenopathy. No facial rash  CVS:  RRR, no murmurs rubs or gallops. Equal 2+ distal pulses.   LUNGS: CTAB, no increased work of breathing  ABDOMEN:  soft NT/ND, +BS, no HSM  SKIN: No rashes or skin lesions. No nail findings  MSK:  TTP in the trapezius region  NEURO: Cranial nerves II-XII intact grossly. 5/5 strength throughout in both upper and lower extremities, sensation intact.  PSYCH: normal mood    LABS:     Component      Latest Ref Rng 11/8/2023   Expanded JORI Antibody Screen, IGG      <0.7 ug/l 0.20    Anti-dsDNA antibody      <10 IU/mL 3.3    Connective Tissue Disease Screen Interpretation      Negative  Negative    RHEUMATOID FACTOR      <14 IU/mL <10    CK      34 - 145 U/L 90    C-REACTIVE PROTEIN      <1.00 mg/dL <0.40    SED RATE      0 - 20 mm/Hr 5        IMAGING:     MRI Left hip 2021:  1.  Normal femoroacetabular bony morphology.   2.  Asymmetrical attritional volume loss, multifocal contour fraying, and intrasubstance mucoid   transformation of the anterosuperior segments of the acetabular vivi, collectively highly   indicative of recurrent mechanical labral impingement, without detached high-grade labral tears,   paralabral cyst formation, or delamination injuries of the anterosuperior chondrolabral junctional   zones.   3. Normal MRI of the sacroiliac and hip joints, without MR signs of active inflammatory   arthropathy.     MRI R hip:  1.  Normal femoroacetabular bony morphology.   2.  Asymmetrical attritional volume loss, multifocal contour fraying, and intrasubstance mucoid   transformation of the anterosuperior segments of the acetabular vivi, collectively highly   indicative of recurrent mechanical labral impingement, without detached high-grade labral tears,   paralabral cyst formation, or delamination injuries of the anterosuperior chondrolabral junctional   zones.   3. Normal MRI of the sacroiliac and hip joints, without MR signs of active inflammatory     ASSESSMENT AND PLAN:     Myofascial pain  - She reports diffuse  joint and muscle pain for many years.  Also intermittent chest pain workup has been essentially normal.  Possible history of pericarditis in 2016 that improved with steroids  - Blood work negative for lupus, rheumatoid arthritis, inflammation.  No synovitis or swelling on exam.  No history of psoriasis  - She tried Cymbalta in the past which initially helped but stopped working  - Plan to try gabapentin 100 mg at night.  Advised to watch out for any dizziness, brain fog, rash, loose stools.  Advised that we can always increase this medication  - Plan to also order HLA-B27 she also has a lot of back pain    S/p right hip surgery for labral impingement  - Currently in physical therapy    Thank you for allowing me to participate in this patients care. Pt will f/u in 3 mos     Hali Francis MD  5/4/2024  8:07 AM

## 2024-05-13 LAB — HLA-B27: NEGATIVE

## 2024-05-15 ENCOUNTER — OFFICE VISIT (OUTPATIENT)
Dept: OBGYN CLINIC | Facility: CLINIC | Age: 29
End: 2024-05-15

## 2024-05-15 VITALS
DIASTOLIC BLOOD PRESSURE: 103 MMHG | HEART RATE: 86 BPM | WEIGHT: 207.81 LBS | BODY MASS INDEX: 36 KG/M2 | SYSTOLIC BLOOD PRESSURE: 153 MMHG

## 2024-05-15 DIAGNOSIS — N94.6 DYSMENORRHEA: ICD-10-CM

## 2024-05-15 DIAGNOSIS — Z01.419 WELL WOMAN EXAM WITH ROUTINE GYNECOLOGICAL EXAM: Primary | ICD-10-CM

## 2024-05-15 PROCEDURE — 99385 PREV VISIT NEW AGE 18-39: CPT | Performed by: OBSTETRICS & GYNECOLOGY

## 2024-05-15 RX ORDER — CHOLECALCIFEROL (VITAMIN D3) 50 MCG
TABLET ORAL
COMMUNITY

## 2024-05-15 RX ORDER — DROSPIRENONE 4 MG/1
1 TABLET, FILM COATED ORAL DAILY
Qty: 84 TABLET | Refills: 4 | Status: SHIPPED | OUTPATIENT
Start: 2024-05-15 | End: 2025-05-15

## 2024-05-15 NOTE — PROGRESS NOTES
Ruth Garcia is a 29 year old female  Patient's last menstrual period was 2024 (approximate).   Chief Complaint   Patient presents with    Gyn Exam     New patient annual // Irregular cycles per patient    Presenting for well woman exam. Last pap smear was more than 4 years ago and normal. Has been taking Oral contraceptive from online source. Reviewed elevated BP and recommendation for progesterone only BC. She also reports frequent headaches.     OBSTETRICS HISTORY:  OB History    Para Term  AB Living   0 0 0 0 0 0   SAB IAB Ectopic Multiple Live Births   0 0 0 0 0       GYNE HISTORY:  Patient's last menstrual period was 2024 (approximate).    History   Sexual Activity    Sexual activity: Yes    Birth control/ protection: OCP        Pap Result Notes: Last pap about 4 years ago per pt      MEDICAL HISTORY:  Past Medical History:    Fibromyalgia         SURGICAL HISTORY:  History reviewed. No pertinent surgical history.    SOCIAL HISTORY:  Social History     Socioeconomic History    Marital status: Single     Spouse name: Not on file    Number of children: Not on file    Years of education: Not on file    Highest education level: Not on file   Occupational History    Not on file   Tobacco Use    Smoking status: Never    Smokeless tobacco: Never   Substance and Sexual Activity    Alcohol use: Yes    Drug use: No    Sexual activity: Yes     Birth control/protection: OCP   Other Topics Concern    Not on file   Social History Narrative    Not on file     Social Determinants of Health     Financial Resource Strain: Not on file   Food Insecurity: Not on file   Transportation Needs: Not on file   Physical Activity: Not on file (2023)   Stress: Not on file   Social Connections: Not on file   Housing Stability: Low Risk  (2024)    Received from [facility]    Housing Stability     Mortgage Payment Concerns?: Not on file     Number of Places Lived in the Last Year:  Not on file     Unstable Housing?: Not on file         Depression Screening (PHQ-2/PHQ-9): Over the LAST 2 WEEKS   Little interest or pleasure in doing things: Not at all    Feeling down, depressed, or hopeless: Not at all    PHQ-2 SCORE: 0           MEDICATIONS:    Current Outpatient Medications:     Cholecalciferol (VITAMIN D) 50 MCG (2000 UT) Oral Tab, Take by mouth., Disp: , Rfl:     Drospirenone (SLYND) 4 MG Oral Tab, Take 1 tablet by mouth daily., Disp: 84 tablet, Rfl: 4    gabapentin 100 MG Oral Cap, Take 1 capsule (100 mg total) by mouth nightly., Disp: 90 capsule, Rfl: 0    Magnesium 500 MG Oral Cap, Take by mouth., Disp: , Rfl:     Ethynodiol Diac-Eth Estradiol 1-35 MG-MCG Oral Tab, Take 1 tablet by mouth daily., Disp: 90 tablet, Rfl: 3    triamcinolone acetonide 0.1 % External Ointment, Apply thin layer to affected skin of bilateral arms nightly. Do not apply to face, underarms, groin or to normal skin., Disp: 80 g, Rfl: 0    amitriptyline 10 MG Oral Tab, Take 1 tablet (10 mg total) by mouth nightly. (Patient not taking: Reported on 5/4/2024), Disp: 30 tablet, Rfl: 0    nitroglycerin 0.4 MG Sublingual SL Tab, Place 1 tablet (0.4 mg total) under the tongue every 5 (five) minutes as needed for Chest pain. May repeat two times. If not better then head to ER (Patient not taking: Reported on 5/4/2024), Disp: 30 tablet, Rfl: 1    ALLERGIES:    Allergies   Allergen Reactions    Seasonal HIVES         Review of Systems:  Review of Systems   All other systems reviewed and are negative.       Vitals:    05/15/24 1817   BP: (!) 153/103   Pulse: 86       PHYSICAL EXAM:   Physical Exam  Vitals reviewed.   Constitutional:       Appearance: Normal appearance.   HENT:      Head: Atraumatic.   Eyes:      Pupils: Pupils are equal, round, and reactive to light.   Pulmonary:      Effort: Pulmonary effort is normal.   Chest:   Breasts:     Right: Normal. No bleeding, inverted nipple, mass, nipple discharge, skin change or  tenderness.      Left: Normal. No bleeding, inverted nipple, mass, nipple discharge, skin change or tenderness.   Abdominal:      General: Abdomen is flat.      Palpations: Abdomen is soft.      Tenderness: There is no abdominal tenderness.   Genitourinary:     General: Normal vulva.      Exam position: Lithotomy position.      Labia:         Right: No rash, tenderness, lesion or injury.         Left: No rash, tenderness, lesion or injury.       Vagina: Normal.      Cervix: Normal.      Uterus: Normal. Not tender.       Adnexa: Right adnexa normal and left adnexa normal.        Right: No tenderness or fullness.          Left: No tenderness or fullness.     Lymphadenopathy:      Upper Body:      Right upper body: No supraclavicular, axillary or pectoral adenopathy.      Left upper body: No supraclavicular, axillary or pectoral adenopathy.   Skin:     General: Skin is warm and dry.   Neurological:      General: No focal deficit present.      Mental Status: She is alert and oriented to person, place, and time.   Psychiatric:         Mood and Affect: Mood normal.         Behavior: Behavior normal.         Thought Content: Thought content normal.         Judgment: Judgment normal.           Assessment & Plan:  Ruth was seen today for gyn exam.    Diagnoses and all orders for this visit:    Well woman exam with routine gynecological exam  -     ThinPrep PAP with HPV Reflex Request B; Future  -     ThinPrep PAP with HPV Reflex Request B    Dysmenorrhea    Other orders  -     Drospirenone (SLYND) 4 MG Oral Tab; Take 1 tablet by mouth daily.        Requested Prescriptions     Signed Prescriptions Disp Refills    Drospirenone (SLYND) 4 MG Oral Tab 84 tablet 4     Sig: Take 1 tablet by mouth daily.       Pap smear collected. Slynd prescription sent. Encourage SBE. Recommend exams yearly. Recommend follow-up with PCP for elevated BP.

## 2024-05-17 ENCOUNTER — TELEPHONE (OUTPATIENT)
Dept: OBGYN CLINIC | Facility: CLINIC | Age: 29
End: 2024-05-17

## 2024-05-17 NOTE — TELEPHONE ENCOUNTER
Per patient, insurance account shows prior authorization submitted yesterday was never received. I re-sent in cover my meds today.    Outcome  Approved today  CaseId:[id number];Status:Approved;Review Type:Prior Auth;Coverage Start Date:04/17/2024;Coverage End Date:05/17/2025;  Authorization Expiration Date: 5/16/2025    Faxed to pharmacy and patient notified.

## 2024-05-17 NOTE — TELEPHONE ENCOUNTER
Patient was notified by insurance that prescription is denied. A prior authorization was submitted. When prescription was re-run, it was denied; use formulary. Please advise.

## 2024-05-20 ENCOUNTER — OFFICE VISIT (OUTPATIENT)
Dept: INTERNAL MEDICINE CLINIC | Facility: CLINIC | Age: 29
End: 2024-05-20

## 2024-05-20 VITALS
BODY MASS INDEX: 35.51 KG/M2 | SYSTOLIC BLOOD PRESSURE: 140 MMHG | HEIGHT: 64 IN | HEART RATE: 72 BPM | DIASTOLIC BLOOD PRESSURE: 98 MMHG | RESPIRATION RATE: 18 BRPM | WEIGHT: 208 LBS

## 2024-05-20 DIAGNOSIS — R03.0 ELEVATED BLOOD PRESSURE READING: ICD-10-CM

## 2024-05-20 DIAGNOSIS — I10 ESSENTIAL HYPERTENSION: Primary | ICD-10-CM

## 2024-05-20 PROCEDURE — 99214 OFFICE O/P EST MOD 30 MIN: CPT | Performed by: INTERNAL MEDICINE

## 2024-05-20 RX ORDER — LISINOPRIL 40 MG/1
40 TABLET ORAL DAILY
Qty: 90 TABLET | Refills: 3 | Status: SHIPPED | OUTPATIENT
Start: 2024-05-20

## 2024-05-20 NOTE — PROGRESS NOTES
Outpatient Office Note    HPI:     Ruth Garcia is a 29 year old female.  Chief Complaint   Patient presents with    Blood Pressure         Pleasant 29-year-old lady with past medical history of fibromyalgia, esophageal spasms, vitamin D deficiency here for elevated blood pressure    Patient was found to have elevated blood pressure at her gynecologist's office.  Her birth control was changed and she is instructed to follow-up with her PCP.  Her blood pressure remains elevated today.  Review of her prior blood pressure readings shows that she has had stage I hypertension prior with blood pressure readings in the high 130s/80s.  She is asymptomatic.  Of note patient had hip surgery and gained 18 pounds since.  She is undergoing physical therapy but remains limited in the amount of physical exercise that she can do due to pain.  Reports history of hypertension and her grandparents.  No other significant family history.          Wt Readings from Last 6 Encounters:   05/20/24 208 lb (94.3 kg)   05/15/24 207 lb 12.8 oz (94.3 kg)   05/04/24 204 lb (92.5 kg)   12/12/23 201 lb (91.2 kg)   11/08/23 200 lb (90.7 kg)   12/20/22 190 lb (86.2 kg)             Current Outpatient Medications   Medication Sig Dispense Refill    lisinopril 40 MG Oral Tab Take 1 tablet (40 mg total) by mouth daily. 90 tablet 3    Cholecalciferol (VITAMIN D) 50 MCG (2000 UT) Oral Tab Take by mouth.      Drospirenone (SLYND) 4 MG Oral Tab Take 1 tablet by mouth daily. 84 tablet 4    gabapentin 100 MG Oral Cap Take 1 capsule (100 mg total) by mouth nightly. 90 capsule 0    Magnesium 500 MG Oral Cap Take by mouth.      triamcinolone acetonide 0.1 % External Ointment Apply thin layer to affected skin of bilateral arms nightly. Do not apply to face, underarms, groin or to normal skin. 80 g 0    amitriptyline 10 MG Oral Tab Take 1 tablet (10 mg total) by mouth nightly. (Patient not taking: Reported on 5/4/2024) 30 tablet 0    Ethynodiol Diac-Eth Estradiol  1-35 MG-MCG Oral Tab Take 1 tablet by mouth daily. (Patient not taking: Reported on 5/20/2024) 90 tablet 3    nitroglycerin 0.4 MG Sublingual SL Tab Place 1 tablet (0.4 mg total) under the tongue every 5 (five) minutes as needed for Chest pain. May repeat two times. If not better then head to ER (Patient not taking: Reported on 5/4/2024) 30 tablet 1      Past Medical History:    Fibromyalgia      History reviewed. No pertinent surgical history.   Social History:  Social History     Socioeconomic History    Marital status: Single   Tobacco Use    Smoking status: Never    Smokeless tobacco: Never   Substance and Sexual Activity    Alcohol use: Yes    Drug use: No    Sexual activity: Yes     Birth control/protection: OCP     Social Determinants of Health      Received from [facility]    Housing Stability      Family History   Problem Relation Age of Onset    Hypertension Maternal Grandmother     Heart Disease Maternal Grandmother     Heart Disease Maternal Grandfather     Hypertension Maternal Grandfather     Blood Cancer Maternal Grandfather       Allergies   Allergen Reactions    Seasonal HIVES        REVIEW OF SYSTEMS:   Review of Systems   Review of Systems   Constitutional: Negative for activity change, appetite change and fever.   HENT: Negative for congestion and voice change.    Respiratory: Negative for cough and shortness of breath.    Cardiovascular: Negative for chest pain.   Gastrointestinal: Negative for abdominal distention, abdominal pain and vomiting.   Genitourinary: Negative for hematuria.   Skin: Negative for wound.   Psychiatric/Behavioral: Negative for behavioral problems.   Wt Readings from Last 5 Encounters:   05/20/24 208 lb (94.3 kg)   05/15/24 207 lb 12.8 oz (94.3 kg)   05/04/24 204 lb (92.5 kg)   12/12/23 201 lb (91.2 kg)   11/08/23 200 lb (90.7 kg)     Body mass index is 35.7 kg/m².      EXAM:   BP (!) 140/98   Pulse 72   Resp 18   Ht 5' 4\" (1.626 m)   Wt 208 lb  (94.3 kg)   LMP 05/06/2024 (Approximate)   BMI 35.70 kg/m²   Physical Exam   Constitutional:       Appearance: Normal appearance.   HENT:      Head: Normocephalic.   Eyes:      Conjunctiva/sclera: Conjunctivae normal.   Cardiovascular:      Rate and Rhythm: Normal rate   Pulmonary:      Effort: Pulmonary effort is normal.   Musculoskeletal:      Cervical back: Neck supple.      Right lower leg: No edema.      Left lower leg: No edema.   Skin:     General: Skin is warm and dry.   Neurological:      General: No focal deficit present.      Mental Status: He is alert and oriented to person, place, and time. Mental status is at baseline.   Psychiatric:         Mood and Affect: Mood normal.         Behavior: Behavior normal.       Health Maintenence:     Health Maintenance Topics with due status: Overdue       Topic Date Due    Pap Smear 08/31/2022    COVID-19 Vaccine 09/01/2023    Annual Physical 12/20/2023     Health Maintenance Topics with due status: Due Soon       Topic Date Due    HTN: BP Follow-Up 06/15/2024            ASSESSMENT AND PLAN:   1. Essential hypertension  -Counseled the patient on the importance of healthy diet, exercise and weight loss  -Told her that per guidelines she technically needs to be started on 2 agents however we will start with 1 and see how she does with that plus lifestyle changes  -Given that she is young we will also get a kidney Doppler ultrasound to rule out secondary hypertension  -She is scheduled for an annual physical visit with her new PCP.  She will get blood work done then.  EKG was ordered in December.  She will also need to get that done  Orders:  - lisinopril 40 MG Oral Tab; Take 1 tablet (40 mg total) by mouth daily.  Dispense: 90 tablet; Refill: 3    2. Elevated blood pressure reading  -Young patient with hypertension.  Need to rule out secondary hypertension.  - US KIDNEY W DOPPLER (CPT=93975/08053); Future          The patient indicates understanding of these issues  and agrees to the plan.  No follow-ups on file.        This note was prepared using Dragon Medical voice recognition dictation software. As a result errors may occur. When identified these errors have been corrected. While every attempt is made to correct errors during dictation discrepancies may still exist.    Dada Marroquin MD

## 2024-05-20 NOTE — PATIENT INSTRUCTIONS
Controlling High Blood Pressure   High blood pressure (hypertension) is often called the silent killer. This is because many people who have it, don’t know it. It can be very dangerous. High blood pressure can raise your risk of heart attack, stroke, heart disease, and heart failure. Controlling your blood pressure can lower your risk of these problems. It's important to check yourblood pressure regularly. It can save your life.   Blood pressure measurements are given as 2 numbers. Systolic blood pressure is the upper number. This is the pressure when the heart contracts. Diastolic blood pressure is the lower number. This is the pressure when the heartrelaxes between beats.   Blood pressure is grouped like this:   Normal blood pressure. This is systolic of less than 120 and diastolic of less than 80 (120/80).  Elevated blood pressure.  This is systolic of 120 to 129 and diastolic less than 80.  Stage 1 high blood pressure.  This is systolic of 130 to 139 or diastolic between 80 to 89.  Stage 2 high blood pressure.  This is systolic of 140 or higher or diastolic of 90 or higher.  A heart-healthy lifestyle can help you control your blood pressure withoutmedicines. Below are some things you can do to have a heart-healthy lifestyle.     Eat heart-healthy foods   Choose low-salt, low-fat foods. Limit your sodium to 2,300 mg per day or the amount advised by your healthcare provider.  Limit canned, dried, cured, packaged, and fast foods. These can contain a lot of salt.  Eat 8 to 10 servings of fruits and vegetables every day.  Choose lean meats, fish, or chicken.  Eat whole-grain pasta, brown rice, and beans.  Eat 2 to 3 servings of low-fat or fat-free dairy products.  Ask your doctor about the DASH eating plan. This plan helps reduce blood pressure.  When you go to a restaurant, ask that your meal be made with no added salt.    Stay at a healthy weight   Ask your healthcare provider how many calories to eat a day. Then  stick to that number.  Ask your provider what weight range is healthiest for you. If you are overweight, a weight loss of only 3% to 5% of your body weight can help lower blood pressure. A good weight loss goal is to lose 10% of your body weight in a year.  Limit snacks and sweets.  Get regular exercise.    Get more active   Find activities you enjoy. They can be done alone or with friends or family. Try bicycling, dancing, walking, or jogging.  Park farther away from building entrances to walk more.  Use stairs instead of the elevator.  When you can, walk or bike instead of driving.  [place] leaves, garden, or do household repairs.  Be active at a moderate to vigorous level of physical activity for at least 30 minutes a day for at least 5 days a week.     Manage stress   Make time to relax and enjoy life. Find time to laugh.  Talk about your concerns with your loved ones and your healthcare provider.  Visit with family and friends, and keep up with hobbies.    Limit alcohol and quit smoking   Men should have no more than 2 drinks per day.  Women should have no more than 1 drink per day.  If you smoke, make a plan to stop. Talk with your healthcare provider for help. Smoking greatly raises your risk for heart disease and stroke. Ask your provider about stop-smoking programs and other support.    Blood pressure medicines  If your lifestyle changes aren’t enough, your healthcare provider may prescribe high blood pressure medicine. Take all medicines as prescribed. If you have any questions about yourmedicines, ask your provider before stopping or changing them.   [employer] last reviewed this educational content on12/1/2021 © 2000-2022 [employer]. All rights reserved. This information is not intended as a substitute for professional medical care. Always follow yourDetwiler Memorial Hospitalcare professional's instruction    Video [employer]™  What is High Blood Pressure?  Understand what blood pressure is, the health risks  of having high blood pressure, the factors that put you at risk for having high blood pressure, and the importance of working with your healthcare provider to control it.  To watch the video:  Scan the QR code  Using your mobile device, scan the following code:  OR  Go to the website:  [employer]  Enter the prescription code:  3414E    © 2000-2022 [employer]. All rights reserved. This information is not intended as a substitute for professional medical care. Always follow your healthcare professional's instructions.    Video [employer]  Eating Well with High Blood Pressure  Certain foods can make your blood pressure go too high. Watch and learn how easy it is to have delicious meals without harming your health.     To watch the video:  Scan the QR code  Using your mobile device, scan the following code:  OR  Go to the website:  www.kramesvideo.com  Enter the prescription code:   QIX       © 2000-2022 [employer]. All rights reserved. This information is not intended as a substitute for professional medical care. Always follow your healthcare professional's instructions.    Taking Your Blood Pressure  Blood pressure is the force of blood against the artery wall as it moves from the heart through the blood vessels. You can take your own blood pressure reading using a digital monitor. Take your readings the same each time, usingthe same arm. Take readings as often as your healthcare provider advises.   About blood pressure monitors  Blood pressure monitors are designed for certain ages and cases. You can find monitors for older adults, for pregnant women, and for children. Make sure theone you choose is the right one for your age and situation.   Experts advise an automatic cuff monitor that fits on your upper arm (bicep). The cuff should fit your arm size. A cuff that’s too large or too small won't give an accurate reading. Measure around yourupper arm to find your  size.   Monitors that attach to your finger or wrist are not as accurate as monitorsfor your upper arm.   Ask your healthcare provider for help in choosing a monitor. Bring your monitorto your next provider visit if you need help in using it the correct way.   The steps below are general instructions for using an automatic digitalmonitor.   Step 1. Relax    Take your blood pressure at the same time every day, such as in the morning or evening. Or take it at the time your healthcare provider advises.  Wait at least 30 minutes after smoking, eating, or exercising. Don't drink coffee, tea, soda, or other caffeinated drinks before checking your blood pressure. Use the restroom beforehand.  Sit comfortably at a table with both feet on the floor. Don't cross your legs or feet. Place the monitor near you.  Rest for at least 5 minutes before you begin. Make sure there are no distractions. This includes TV, cell phones, and other electronics. Wait to have conversations with others until after you measure you blood pressure.  Step 2. Wrap the cuff    Place your arm on the table, palm up. Your arm should be at the level of your heart. Wrap the cuff around your upper arm, just above your elbow. It’s best done on bare skin, not over clothing. Most cuffs will show you where the blood vessel in the middle of the arm at the inner side of the elbow (the brachial artery) should line up with the cuff. Look in your monitor's instruction booklet for an illustration. You can also bring your cuff to your healthcare provider and have them show you how to correctly place the cuff.  Step 3. Inflate the cuff    Push the button that starts the pump.  The cuff will tighten, then loosen.  The numbers will change. When they stop changing, your blood pressure reading will appear.  Take 2 or 3 readings 1 minute apart, or as advised by your provider.  Step 4. Write down the results of each reading    Write down your blood pressure numbers for each  reading. Note the date and time. Keep your results in 1 place, such as a notebook. Even if your monitor has a built-in memory, keep a hard copy of the readings.  Remove the cuff from your arm. Turn off the machine.  Bring your blood pressure records with you to each provider visit.  If you start a new blood pressure medicine, note the day you started the new medicine. Also note the day if you change the dose of your medicine. Measure your blood pressure before your take your medicine. This information goes on your blood pressure recording sheet. This will help your provider check how well the medicine changes are working.  Ask your provider what numbers mean that you should call them. Also ask what numbers mean that you should get help right away.  [employer] last reviewed this educational content on12/1/2021 © 2000-2022 [employer]. All rights reserved. This information is not intended as a substitute for professional medical care. Always follow yourhealthcare professional's instructions.

## 2024-05-21 LAB
.: NORMAL
.: NORMAL

## 2024-06-01 ENCOUNTER — PATIENT MESSAGE (OUTPATIENT)
Dept: INTERNAL MEDICINE CLINIC | Facility: CLINIC | Age: 29
End: 2024-06-01

## 2024-06-02 NOTE — TELEPHONE ENCOUNTER
From: Ruth Garcia  To: Dada Marroquin  Sent: 6/1/2024 1:32 PM CDT  Subject: Medication    Hello I was told that if I experience a dry cough after starting lisinopril to inform you. So just want to let you know that I am now coughing.     Ruth

## 2024-06-03 RX ORDER — NIFEDIPINE 30 MG
30 TABLET, EXTENDED RELEASE ORAL DAILY
Qty: 90 TABLET | Refills: 0 | Status: SHIPPED | OUTPATIENT
Start: 2024-06-03

## 2024-06-03 NOTE — TELEPHONE ENCOUNTER
Dr. Princess Chin kindly see message below. Patient will be establishing care with you on 6/6, patient of Dr. Marroquin.    Future Appointments   Date Time Provider Department Center   6/4/2024  7:30 AM [facility] US [facility] US [facility] [place]   6/6/2024  1:40 PM Princess Chin MD [facility]   9/11/2024  9:40 AM Hail Francis MD [facility]

## 2024-06-03 NOTE — TELEPHONE ENCOUNTER
Patient was started on Lisinopril 5/20/24 by Dr Marroquin  and advised if she starts having a cough , make  aware, pt establishing care 6/6/24

## 2024-06-04 ENCOUNTER — HOSPITAL ENCOUNTER (OUTPATIENT)
Dept: ULTRASOUND IMAGING | Age: 29
Discharge: HOME | End: 2024-06-04
Attending: INTERNAL MEDICINE
Payer: COMMERCIAL

## 2024-06-04 DIAGNOSIS — R03.0 ELEVATED BLOOD PRESSURE READING: ICD-10-CM

## 2024-06-04 PROCEDURE — 76775 US EXAM ABDO BACK WALL LIM: CPT | Performed by: INTERNAL MEDICINE

## 2024-06-04 PROCEDURE — 93975 VASCULAR STUDY: CPT | Performed by: INTERNAL MEDICINE

## 2024-06-04 NOTE — TELEPHONE ENCOUNTER
Stop lisinopril- (cough may continue for a while after stopping )   Will start nifedipine but pls let pt know that just like with lisinopril she should tell us immediately if she becomes  pregnant while on this medication

## 2024-06-06 ENCOUNTER — OFFICE VISIT (OUTPATIENT)
Dept: INTERNAL MEDICINE CLINIC | Facility: CLINIC | Age: 29
End: 2024-06-06
Payer: COMMERCIAL

## 2024-06-06 VITALS
BODY MASS INDEX: 33.79 KG/M2 | DIASTOLIC BLOOD PRESSURE: 85 MMHG | OXYGEN SATURATION: 98 % | HEIGHT: 64.5 IN | WEIGHT: 200.38 LBS | HEART RATE: 90 BPM | SYSTOLIC BLOOD PRESSURE: 122 MMHG

## 2024-06-06 DIAGNOSIS — M79.7 FIBROMYALGIA: ICD-10-CM

## 2024-06-06 DIAGNOSIS — E55.9 VITAMIN D DEFICIENCY: ICD-10-CM

## 2024-06-06 DIAGNOSIS — E66.09 CLASS 1 OBESITY DUE TO EXCESS CALORIES WITH SERIOUS COMORBIDITY AND BODY MASS INDEX (BMI) OF 33.0 TO 33.9 IN ADULT: Primary | ICD-10-CM

## 2024-06-06 PROCEDURE — 99214 OFFICE O/P EST MOD 30 MIN: CPT | Performed by: INTERNAL MEDICINE

## 2024-06-06 RX ORDER — LISINOPRIL 40 MG/1
40 TABLET ORAL DAILY
COMMUNITY

## 2024-06-06 NOTE — PROGRESS NOTES
Ruth Garcia is a 29 year old female.  Chief Complaint   Patient presents with    Establish Care       HPI:   New patient  C/C establish care- referred by dr mendez   C/o HTN - taking lisinopril , never took the sleeping, thought that the lisinopril had caused cough but since the cough went away she discontinued with the lisinopril  Never took the amitriptyline which was given in November for fibromyalgia pain and was just started on the gabapentin -noted she is on the lower dose   about 4 weeks ago so she still getting used to it  Off the combined oral contraceptive pill due to blood pressure that was elevated  Takes magnesium for constipation   She struggles with weight loss -eating   Hasn't had the CP is a long time   Will see the rheumatologist in September    PMH  Vit D def  HTN  fibromyalgia  Eczema-on triamcinolone ointment  Nonspecific CP -had full cardiology workup done at [facility] and was put on nitroglycerin to be taken on as-needed basis--dr day seen in [place]     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      Drospirenone (SLYND) 4 MG Oral Tab Take 1 tablet by mouth daily. 84 tablet 4    gabapentin 100 MG Oral Cap Take 1 capsule (100 mg total) by mouth nightly. 90 capsule 0    Magnesium 500 MG Oral Cap Take by mouth.      nitroglycerin 0.4 MG Sublingual SL Tab Place 1 tablet (0.4 mg total) under the tongue every 5 (five) minutes as needed for Chest pain. May repeat two times. If not better then head to ER 30 tablet 1    triamcinolone acetonide 0.1 % External Ointment Apply thin layer to affected skin of bilateral arms nightly. Do not apply to face, underarms, groin or to normal skin. 80 g 0    lisinopril 40 MG Oral Tab Take 1 tablet (40 mg total) by mouth daily.        Past Medical History:    Fibromyalgia      Past Surgical History:    Procedure Laterality Date    Other surgical history Right     arthroscopic hip surgery for torn labrum      Social History:  Social History     Socioeconomic History    Marital status: Single   Tobacco Use    Smoking status: Never    Smokeless tobacco: Never   Vaping Use    Vaping status: Never Used   Substance and Sexual Activity    Alcohol use: Yes    Drug use: No    Sexual activity: Yes     Birth control/protection: OCP     Social Determinants of Health      Received from [facility]    Housing Stability        REVIEW OF SYSTEMS:   GENERAL HEALTH: No fevers, chills, sweats, fatigue  VISION: No recent vision problems, blurry vision or double vision  RESPIRATORY: denies shortness of breath, cough, wheezing  CARDIOVASCULAR: denies chest pain on exertion, palpitations, swelling in feet  NEURO: denies headaches , anxiety, depression    EXAM:   /85 (BP Location: Left arm, Patient Position: Sitting)   Pulse 90   Ht 5' 4.5\" (1.638 m)   Wt 200 lb 6 oz (90.9 kg)   LMP 05/06/2024 (Approximate)   SpO2 98%   BMI 33.86 kg/m²   GENERAL: well developed, well nourished,in no apparent distress  SKIN: no rashes,no suspicious lesions  HEENT: atraumatic, normocephalic  LUNGS: clear to auscultation, no wheeze  CARDIO: RRR without murmur  EXTREMITIES: no cyanosis, or edema    ASSESSMENT AND PLAN:   Diagnoses and all orders for this visit:    Class 1 obesity due to excess calories with serious comorbidity and body mass index (BMI) of 33.0 to 33.9 in adult  -     [facility] Location  -     DIETITIAN EDUCATION INITIAL, DIET (INTERNAL)  -     Comp Metabolic Panel (14); Future  -     Lipid Panel; Future  -     Hemoglobin A1C; Future  -     Assay, Thyroid Stim Hormone; Future  -     CBC, Platelet; No Differential; Future  -     Thyroxine, Free [E]; Future  -     Free T3 (Triiodothryronine); Future  Patient to watch what she eats and exercise with a goal subset thousand steps  a day, refer to the weight management clinic, waiting need to see nutritionist first which I believe she will benefit from too    Vitamin D deficiency  -     Vitamin D; Future  Recheck   Fibromyalgia  -     Comp Metabolic Panel (14); Future  -     Lipid Panel; Future  -     Hemoglobin A1C; Future  -     Assay, Thyroid Stim Hormone; Future  -     Vitamin D; Future  -     CBC, Platelet; No Differential; Future  Continue medications and follow-up with rheumatology-she already has the appointment        Preventive medicine  Pap smear-Dr. Angela 5/2024   Labs 12/2024           The patient indicates understanding of these issues and agrees to the plan.  No follow-ups on file.

## 2024-06-06 NOTE — PATIENT INSTRUCTIONS
Eating for your heart doesn’t have to be hard or boring. You just need to know how to make healthier choices. The DASH eating plan has been developed to help you do just that. DASH stands for Dietary Approaches to Stop Hypertension. It is a plan that has been proven to be healthier for your heart and to lower your risk for high blood pressure. It can also help lower your risk for cancer, heart disease, osteoporosis, and diabetes.  Choosing from each food group  Choose foods from each of the food groups below each day. Try to get the recommended number of servings for each food group. The serving numbers are based on a diet of 2,000 calories a day. Talk to your doctor if you’re unsure about your calorie needs. Along with getting the correct servings, the DASH plan also recommends a sodium intake less than 2,300 mg per day.        Grains  Servings: 6 to 8 a day  A serving is:  1 slice bread  1 ounce dry cereal  Half a cup cooked rice, pasta or cereal  Best choices: Whole grains and any grains high in fiber. Vegetables  Servings: 4 to 5 a day  A serving is:  1 cup raw leafy vegetable  Half a cup cut-up raw or cooked vegetable  Half a cup vegetable juice  Best choices: Fresh or frozen vegetables prepared without added salt or fat.   Fruits  Servings: 4 to 5 a day  A serving is:  1 medium fruit  One-quarter cup dried fruit  Half a cup fresh, frozen, or canned fruit  Half a cup of 100% fruit juices  Best choices: A variety of fresh fruits of different colors. Whole fruits are a better choice than fruit juices. Low-fat or fat-free dairy  Servings: 2 to 3 a day  A serving is:  1 cup milk  1 cup yogurt  One and a half ounces cheese  Best choices: Skim or 1% milk, low-fat or fat-free yogurt or buttermilk, and low-fat cheeses.         Lean meats, poultry, fish  Servings: 6 or fewer a day  A serving is:  1 ounce cooked meats, poultry, or fish  1 egg  Best choices: Lean poultry and fish. Trim away visible fat. Broil, grill,  roast, or boil instead of frying. Remove skin from poultry before eating. Limit how much red meat you eat.  Nuts, seeds, beans  Servings: 4 to 5 a week  A serving is:  One-third cup nuts (one and a half ounces)  2 tablespoons nut butter or seeds  Half a cup cooked dry beans or legumes  Best choices: Dry roasted nuts with no salt added, lentils, kidney beans, garbanzo beans, and whole lizarraga beans.   Fats and oils  Servings: 2 to 3 a day  A serving is:  1 teaspoon vegetable oil  1 teaspoon soft margarine  1 tablespoon mayonnaise  2 tablespoons salad dressing  Best choices: Nut and vegetable oils (nontropical vegetable oils), such as olive and canola oil. Sweets  Servings: 5 a week or fewer  A serving is:  1 tablespoon sugar, maple syrup, or honey  1 tablespoon jam or jelly  1 half-ounce jelly beans (about 15)  1 cup lemonade  Best choices: Dried fruit can be a satisfying sweet. Choose low-fat sweets. And watch your serving sizes!      For more on the DASH eating plan, visit:  www.nhlbi.nih.gov/health/health-topics/topics/dash   Date Last Reviewed: 6/1/2016 © 2000-2018 [employer] [address] [place] [address]. All rights reserved. This information is not intended as a substitute for professional medical care. Always follow your healthcare professional's instructions. Low-Salt Choices  Eating salt (sodium) can make your body retain too much water. Excess water makes your heart work harder. Canned, packaged, and frozen foods are easy to prepare. But they are often high in sodium. Here are some ideas for low-salt foods you can easily make yourself.  For breakfast  Fruit or 100% fruit juice  Whole-wheat bread or an English muffin. Look for sodium content on Nutrition Facts labels.  Low-fat milk or yogurt  Unsalted eggs  Shredded wheat  Corn tortillas  Unsalted steamed rice  Regular (not instant) hot cereal, made without salt  Stay away from:  Sausage, kapoor, and ham  Flour tortillas  Packaged  muffins, pancakes, and biscuits  Instant hot cereals  Cottage cheese  For lunch and dinner  Fresh fish, chicken, turkey, or meat--baked, broiled, or roasted without salt  Dry beans, cooked without salt  Tofu, stir-fried without salt  Unsalted fresh fruit and vegetables, or frozen or canned fruit and vegetables with no added salt  Stay away from:  Lunch or deli meat that is cured or smoked  Cheese  Tomato juice and ketchup  Canned vegetables, soups, and fish not labeled as no-salt-added or reduced sodium  Packaged gravies and sauces  Olives, pickles, and relish  Bottled salad dressings  For snacks and desserts  Yogurt  Unsalted, air-popped popcorn  Unsalted nuts or seeds  Stay away from:  Pies and cakes  Packaged dessert mixes  Pizza  Canned and packaged puddings  Pretzels, chips, crackers, and nuts--unless the label says unsalted  Date Last Reviewed: 6/1/2017  © 3733-0880 [employer]. [address] [place] [address], [place] [address]. All rights reserved. This information is not intended as a substitute for professional medical care. Always follow your healthcare professional's instructions.

## 2024-06-25 ENCOUNTER — PATIENT MESSAGE (OUTPATIENT)
Dept: OBGYN CLINIC | Facility: CLINIC | Age: 29
End: 2024-06-25

## 2024-06-25 NOTE — TELEPHONE ENCOUNTER
From: Ruth Garcia  To: Radha Angela  Sent: 6/25/2024 8:55 AM CDT  Subject: Regarding periods    Hello just had a question I have been taking the new birth control for almost 2 months. When I finished the first pack of the pills it has 4 placebo pills and I assumed I would get my period. I did not get my period so I just want to make sure that if it is normal for this medication or not. Also curious if I could get blood work done to see if I could possibly have Pcos since my periods are never consistent. And I feel like I have other symptoms.     Thank you  Ruth

## 2024-06-25 NOTE — TELEPHONE ENCOUNTER
I do not recommend testing while she is on hormones since results are not likely to be accurate. OCP use is one of the treatments for PCOS, so remaining on the current medication would be recommended anyway.

## 2024-06-25 NOTE — TELEPHONE ENCOUNTER
Patient currently on Slynd and did not get a period with the first month.  Patient advised this is not uncommon.  Patient requesting testing for PCOS.  States her periods are very irregular, she os losing hair, has random cramping, feels bloated, has skin change (increase in acne and excess oil).  Message to Dr. Angela for recommendations.

## 2024-08-08 ENCOUNTER — TELEPHONE (OUTPATIENT)
Dept: OBGYN CLINIC | Facility: CLINIC | Age: 29
End: 2024-08-08

## 2024-08-08 NOTE — TELEPHONE ENCOUNTER
Received fax from [employer] that Slynd is not covered by patient's plan. Patient informed. Patient states she received a notification that Slynd is ready for  with no charge. Patient informed BC was ordered with enough refills to cover her until her next annual and advised to call back if she has any issues with her prescription. Patient verbalized understanding.

## 2024-08-24 ENCOUNTER — LAB ENCOUNTER (OUTPATIENT)
Dept: LAB | Age: 29
End: 2024-08-24
Attending: INTERNAL MEDICINE
Payer: COMMERCIAL

## 2024-08-24 DIAGNOSIS — M79.7 FIBROMYALGIA: ICD-10-CM

## 2024-08-24 DIAGNOSIS — Z11.1 SCREENING FOR TUBERCULOSIS: ICD-10-CM

## 2024-08-24 DIAGNOSIS — E66.09 CLASS 1 OBESITY DUE TO EXCESS CALORIES WITH SERIOUS COMORBIDITY AND BODY MASS INDEX (BMI) OF 33.0 TO 33.9 IN ADULT: ICD-10-CM

## 2024-08-24 DIAGNOSIS — E55.9 VITAMIN D DEFICIENCY: ICD-10-CM

## 2024-08-24 LAB
ALBUMIN SERPL-MCNC: 4.3 G/DL (ref 3.2–4.8)
ALBUMIN/GLOB SERPL: 1.7 {RATIO} (ref 1–2)
ALP LIVER SERPL-CCNC: 58 U/L
ALT SERPL-CCNC: 11 U/L
ANION GAP SERPL CALC-SCNC: 7 MMOL/L (ref 0–18)
AST SERPL-CCNC: 15 U/L (ref ?–34)
BILIRUB SERPL-MCNC: 1.1 MG/DL (ref 0.3–1.2)
BUN BLD-MCNC: 15 MG/DL (ref 9–23)
BUN/CREAT SERPL: 18.3 (ref 10–20)
CALCIUM BLD-MCNC: 9.6 MG/DL (ref 8.7–10.4)
CHLORIDE SERPL-SCNC: 111 MMOL/L (ref 98–112)
CHOLEST SERPL-MCNC: 179 MG/DL (ref ?–200)
CO2 SERPL-SCNC: 24 MMOL/L (ref 21–32)
CREAT BLD-MCNC: 0.82 MG/DL
DEPRECATED RDW RBC AUTO: 44 FL (ref 35.1–46.3)
EGFRCR SERPLBLD CKD-EPI 2021: 100 ML/MIN/1.73M2 (ref 60–?)
ERYTHROCYTE [DISTWIDTH] IN BLOOD BY AUTOMATED COUNT: 12.7 % (ref 11–15)
EST. AVERAGE GLUCOSE BLD GHB EST-MCNC: 82 MG/DL (ref 68–126)
FASTING PATIENT LIPID ANSWER: YES
FASTING STATUS PATIENT QL REPORTED: YES
GLOBULIN PLAS-MCNC: 2.6 G/DL (ref 2–3.5)
GLUCOSE BLD-MCNC: 91 MG/DL (ref 70–99)
HBA1C MFR BLD: 4.5 % (ref ?–5.7)
HCT VFR BLD AUTO: 38.4 %
HDLC SERPL-MCNC: 49 MG/DL (ref 40–59)
HGB BLD-MCNC: 12.8 G/DL
LDLC SERPL CALC-MCNC: 119 MG/DL (ref ?–100)
MCH RBC QN AUTO: 31.8 PG (ref 26–34)
MCHC RBC AUTO-ENTMCNC: 33.3 G/DL (ref 31–37)
MCV RBC AUTO: 95.5 FL
NONHDLC SERPL-MCNC: 130 MG/DL (ref ?–130)
OSMOLALITY SERPL CALC.SUM OF ELEC: 294 MOSM/KG (ref 275–295)
PLATELET # BLD AUTO: 260 10(3)UL (ref 150–450)
POTASSIUM SERPL-SCNC: 4.2 MMOL/L (ref 3.5–5.1)
PROT SERPL-MCNC: 6.9 G/DL (ref 5.7–8.2)
RBC # BLD AUTO: 4.02 X10(6)UL
SODIUM SERPL-SCNC: 142 MMOL/L (ref 136–145)
T3FREE SERPL-MCNC: 3.65 PG/ML (ref 2.4–4.2)
T4 FREE SERPL-MCNC: 1.2 NG/DL (ref 0.8–1.7)
TRIGL SERPL-MCNC: 57 MG/DL (ref 30–149)
TSI SER-ACNC: 1.81 MIU/ML (ref 0.55–4.78)
VIT D+METAB SERPL-MCNC: 58.6 NG/ML (ref 30–100)
VLDLC SERPL CALC-MCNC: 10 MG/DL (ref 0–30)
WBC # BLD AUTO: 6.2 X10(3) UL (ref 4–11)

## 2024-08-24 PROCEDURE — 82306 VITAMIN D 25 HYDROXY: CPT

## 2024-08-24 PROCEDURE — 84443 ASSAY THYROID STIM HORMONE: CPT

## 2024-08-24 PROCEDURE — 80053 COMPREHEN METABOLIC PANEL: CPT

## 2024-08-24 PROCEDURE — 84481 FREE ASSAY (FT-3): CPT

## 2024-08-24 PROCEDURE — 86480 TB TEST CELL IMMUN MEASURE: CPT

## 2024-08-24 PROCEDURE — 84439 ASSAY OF FREE THYROXINE: CPT

## 2024-08-24 PROCEDURE — 83036 HEMOGLOBIN GLYCOSYLATED A1C: CPT

## 2024-08-24 PROCEDURE — 80061 LIPID PANEL: CPT

## 2024-08-24 PROCEDURE — 36415 COLL VENOUS BLD VENIPUNCTURE: CPT

## 2024-08-24 PROCEDURE — 85027 COMPLETE CBC AUTOMATED: CPT

## 2024-08-26 LAB
M TB IFN-G CD4+ T-CELLS BLD-ACNC: 0 IU/ML
M TB TUBERC IFN-G BLD QL: NEGATIVE
M TB TUBERC IGNF/MITOGEN IGNF CONTROL: >10 IU/ML
QFT TB1 AG MINUS NIL: 0 IU/ML
QFT TB2 AG MINUS NIL: 0.02 IU/ML

## 2024-08-30 ENCOUNTER — MED REC SCAN ONLY (OUTPATIENT)
Dept: INTERNAL MEDICINE CLINIC | Facility: CLINIC | Age: 29
End: 2024-08-30

## 2024-09-04 ENCOUNTER — OFFICE VISIT (OUTPATIENT)
Dept: INTERNAL MEDICINE CLINIC | Facility: CLINIC | Age: 29
End: 2024-09-04
Payer: COMMERCIAL

## 2024-09-04 ENCOUNTER — MED REC SCAN ONLY (OUTPATIENT)
Dept: INTERNAL MEDICINE CLINIC | Facility: CLINIC | Age: 29
End: 2024-09-04

## 2024-09-04 VITALS
HEIGHT: 64.5 IN | DIASTOLIC BLOOD PRESSURE: 77 MMHG | BODY MASS INDEX: 34.34 KG/M2 | HEART RATE: 78 BPM | SYSTOLIC BLOOD PRESSURE: 114 MMHG | WEIGHT: 203.63 LBS

## 2024-09-04 DIAGNOSIS — S39.012A STRAIN OF LUMBAR REGION, INITIAL ENCOUNTER: ICD-10-CM

## 2024-09-04 DIAGNOSIS — Z00.00 PHYSICAL EXAM, ANNUAL: Primary | ICD-10-CM

## 2024-09-04 PROCEDURE — 99395 PREV VISIT EST AGE 18-39: CPT | Performed by: INTERNAL MEDICINE

## 2024-09-04 PROCEDURE — 99213 OFFICE O/P EST LOW 20 MIN: CPT | Performed by: INTERNAL MEDICINE

## 2024-09-04 NOTE — PROGRESS NOTES
Ruth Garcia is a 29 year old female.  Chief Complaint   Patient presents with    Physical     Adult exam        HPI:   C/C annual physical  C/ o Started [employer] [employer] school- [employer] -has forms that she needs to be filled patient comes for annual physical  Appointment with the weight management clinic in October Fri she hurt her back - ie 5 days ago - was jaz her toenails on the bed and when she leaned fwd she felt pain and since then the pain is still there   Worse when bending fwd or after walking a lot   Better ?   Tried advil icy hot and heating pad -pad works only when she uses it  Overall slightly better but not gone    HISTORY   New patient  C/C establish care- referred by dr mendez   C/o HTN - taking lisinopril , never took the sleeping, thought that the lisinopril had caused cough but since the cough went away she discontinued with the lisinopril  Never took the amitriptyline which was given in November for fibromyalgia pain and was just started on the gabapentin -noted she is on the lower dose   about 4 weeks ago so she still getting used to it  Off the combined oral contraceptive pill due to blood pressure that was elevated  Takes magnesium for constipation   She struggles with weight loss -eating   Hasn't had the CP is a long time   Will see the rheumatologist in September     PMH  Vit D def  HTN  fibromyalgia  Eczema-on triamcinolone ointment  Nonspecific CP -had full cardiology workup done at [facility] and was put on nitroglycerin to be taken on as-needed basis--dr day seen in [place]     Gyn  Dr andrea collier - ocp      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 90 capsule 0    lisinopril 40 MG  Oral Tab Take 1 tablet (40 mg total) by mouth daily.      Cholecalciferol (VITAMIN D) 50 MCG (2000 UT) Oral Tab Take by mouth.      Drospirenone (SLYND) 4 MG Oral Tab Take 1 tablet by mouth daily. 84 tablet 4    Magnesium 500 MG Oral Cap Take by mouth.      nitroglycerin 0.4 MG Sublingual SL Tab Place 1 tablet (0.4 mg total) under the tongue every 5 (five) minutes as needed for Chest pain. May repeat two times. If not better then head to ER 30 tablet 1    triamcinolone acetonide 0.1 % External Ointment Apply thin layer to affected skin of bilateral arms nightly. Do not apply to face, underarms, groin or to normal skin. 80 g 0      Past Medical History:    Fibromyalgia      Past Surgical History:   Procedure Laterality Date    Other surgical history Right     arthroscopic hip surgery for torn labrum      Social History:  Social History     Socioeconomic History    Marital status: Single   Tobacco Use    Smoking status: Never    Smokeless tobacco: Never   Vaping Use    Vaping status: Never Used   Substance and Sexual Activity    Alcohol use: Yes    Drug use: No    Sexual activity: Yes     Birth control/protection: OCP     Social Determinants of Health      Received from [facility]    Housing Stability        REVIEW OF SYSTEMS:   GENERAL HEALTH: No fevers, chills, sweats, fatigue  VISION: No recent vision problems, blurry vision or double vision  HEENT: No decreased hearing ear pain nasal congestion or sore throat  SKIN: denies any unusual skin lesions or rashes  RESPIRATORY: denies shortness of breath, cough, wheezing  CARDIOVASCULAR: denies chest pain on exertion, palpitations, swelling in feet  GI: denies abdominal pain and denies heartburn, nausea or vomiting  : No Pain on urination, change in the color of urine, discharge, urinating frequently  MUS:  + back pain,no other  joint pain or  muscle pain  NEURO: denies headaches , anxiety, depression    EXAM:   /77   Pulse 78   Ht 5' 4.5\"  (1.638 m)   Wt 203 lb 9.6 oz (92.4 kg)   LMP 05/06/2024 (Approximate)   BMI 34.41 kg/m²   GENERAL: well developed, well nourished,in no apparent distress  SKIN: no rashes,no suspicious lesions  HEENT: atraumatic, normocephalic,ears, left ear impacted cerumen right tympanic membrane normal and throat are clear, no frontal or maxillary sinus tenderness, pupils equal reactive to light bilaterally, extraocular muscles intact  NECK: supple,no adenopathy,nontender   LUNGS: clear to auscultation, no wheeze  CARDIO: RRR without murmur  GI: good BS's,no masses or tenderness  EXTREMITIES: no cyanosis, or edema  No spinal or paraspinal tenderness    ASSESSMENT AND PLAN:   Diagnoses and all orders for this visit:    Physical exam, annual    Strain of lumbar region, initial encounter      Advised patient to watch what she eats and  exercise, seatbelt use no texting driving, sunscreen use advised  Will give flexeril as she had had this in the past and it has helped         Preventive medicine  Pap smear-Dr. Angela 5/2024   Labs 12/2024 , 8/2024          The patient indicates understanding of these issues and agrees to the plan.  No follow-ups on file.

## 2024-09-06 ENCOUNTER — PATIENT MESSAGE (OUTPATIENT)
Dept: INTERNAL MEDICINE CLINIC | Facility: CLINIC | Age: 29
End: 2024-09-06

## 2024-09-06 RX ORDER — CYCLOBENZAPRINE HCL 10 MG
10 TABLET ORAL DAILY PRN
Qty: 20 TABLET | Refills: 0 | Status: SHIPPED | OUTPATIENT
Start: 2024-09-06 | End: 2024-09-26

## 2024-09-06 NOTE — TELEPHONE ENCOUNTER
Dr. Chin, please advise. See note below from office visit on 9/4/24:    \"Will give flexeril as she had had this in the past and it has helped\"    From: Ruth Garcia  To: Princess Chin  Sent: 9/6/2024  7:22 AM CDT  Subject: Medication    Hello I was just in for my physical on Wednesday. And I mentioned that I hurt my back and think I pulled a muscle. Dr. Chin said she was gonna send a muscle relaxer to my pharmacy but has not yet. If you could please ask her to send that it would be great and help!!  Thank you  Ruth

## 2024-09-11 ENCOUNTER — OFFICE VISIT (OUTPATIENT)
Dept: RHEUMATOLOGY | Facility: CLINIC | Age: 29
End: 2024-09-11

## 2024-09-11 VITALS
HEART RATE: 70 BPM | DIASTOLIC BLOOD PRESSURE: 74 MMHG | SYSTOLIC BLOOD PRESSURE: 107 MMHG | WEIGHT: 203 LBS | HEIGHT: 64.5 IN | BODY MASS INDEX: 34.24 KG/M2

## 2024-09-11 DIAGNOSIS — Z51.81 MEDICATION MONITORING ENCOUNTER: ICD-10-CM

## 2024-09-11 DIAGNOSIS — M79.18 MYOFASCIAL PAIN: Primary | ICD-10-CM

## 2024-09-11 PROCEDURE — 99214 OFFICE O/P EST MOD 30 MIN: CPT | Performed by: INTERNAL MEDICINE

## 2024-09-11 RX ORDER — GABAPENTIN 300 MG/1
300 CAPSULE ORAL 2 TIMES DAILY
Qty: 180 CAPSULE | Refills: 1 | Status: SHIPPED | OUTPATIENT
Start: 2024-09-11

## 2024-09-11 NOTE — PROGRESS NOTES
Ruth Garcia is a 29 year old female.    HPI:     Chief Complaint   Patient presents with    Follow - Up       I had the pleasure of seeing Ruth Garcia on 9/11/2024 for follow up myofascial pain    Current Medications:  Gabapentin 300 mg nightly- started 5/2024  Past medications:  Cymbalta- worked in the past but stopped working   Blood work:  Neg CTD screen, dsDNA  Neg ESR, CRP, CK, RF, HLA-B27    Interval History:  This is a 29 yo F with hx of GERD referred for joint and muscle pain. She reports joint pain in the knees and elbows. Also has lower back pain. Hands can be stiff in the morning but not much pain. No swelling in the joints. She has eczema but not hx of psoriasis. Has diffuse muscle pain all over. Muscles can be tender to touch. At times has some chest pain intermittently, at times tender to touch but then its deep. Has had w/u done EKG, echocardiogram and CT chest and normal. Chest pain started around 2016. She states that she was diagnosed with possible esophageal spasms. At one time they thought she had pericarditis and she was placed on steroids which did help her symptoms.   She had a right lateral impingement surgery jan 8 and now in PT.  She was on Cymbalta in the past and helped in the beginning but then did not work.  Now taking advil as needed for pain. She was prescribed Elavil but has not started it.    9/11/2024:  Presents for f/u of myofascial pain  Now on gabapentin 300 mg daily  She has less muscle and joint pain  She is also flexeril, started last night for lower back pain  No rashes  No numbness or tingling  Right hips is doing well after surgery  No recent chest pain          HISTORY:  Past Medical History:    Fibromyalgia      Social Hx Reviewed   Family Hx Reviewed     Medications (Active prior to today's visit):  Current Outpatient Medications   Medication Sig Dispense Refill    cyclobenzaprine 10 MG Oral Tab Take 1 tablet (10 mg total) by mouth daily as needed for  Muscle spasms. 20 tablet 0    gabapentin 300 MG Oral Cap Take 1 capsule (300 mg total) by mouth nightly. 90 capsule 0    lisinopril 40 MG Oral Tab Take 1 tablet (40 mg total) by mouth daily.      Cholecalciferol (VITAMIN D) 50 MCG (2000 UT) Oral Tab Take by mouth.      Drospirenone (SLYND) 4 MG Oral Tab Take 1 tablet by mouth daily. 84 tablet 4    Magnesium 500 MG Oral Cap Take by mouth.      nitroglycerin 0.4 MG Sublingual SL Tab Place 1 tablet (0.4 mg total) under the tongue every 5 (five) minutes as needed for Chest pain. May repeat two times. If not better then head to ER 30 tablet 1    triamcinolone acetonide 0.1 % External Ointment Apply thin layer to affected skin of bilateral arms nightly. Do not apply to face, underarms, groin or to normal skin. 80 g 0     .cmed  Allergies:  Allergies   Allergen Reactions    Seasonal HIVES         ROS:   All other ROS are negative.     PHYSICAL EXAM:   GEN: AAOx3, NAD  HEENT: EOMI, PERRLA, no injection or icterus, oral mucosa moist, no oral lesions. No lymphadenopathy. No facial rash  CVS: RRR, no murmurs rubs or gallops. Equal 2+ distal pulses.   LUNGS: CTAB, no increased work of breathing  ABDOMEN:  soft NT/ND, +BS, no HSM  SKIN: No rashes or skin lesions. No nail findings  MSK:  TTP in the trapezius region   No swelling or synovitis on exam  Full range of motion all joint  NEURO: Cranial nerves II-XII intact grossly. 5/5 strength throughout in both upper and lower extremities, sensation intact.  PSYCH: normal mood       LABS:       Component      Latest Ref Rng 11/8/2023   Expanded JORI Antibody Screen, IGG      <0.7 ug/l 0.20    Anti-dsDNA antibody      <10 IU/mL 3.3    Connective Tissue Disease Screen Interpretation      Negative  Negative    RHEUMATOID FACTOR      <14 IU/mL <10    CK      34 - 145 U/L 90    C-REACTIVE PROTEIN      <1.00 mg/dL <0.40    SED RATE      0 - 20 mm/Hr 5         Imaging:     MRI Left hip 2021:  1.  Normal femoroacetabular bony morphology.   2.   Asymmetrical attritional volume loss, multifocal contour fraying, and intrasubstance mucoid   transformation of the anterosuperior segments of the acetabular vivi, collectively highly   indicative of recurrent mechanical labral impingement, without detached high-grade labral tears,   paralabral cyst formation, or delamination injuries of the anterosuperior chondrolabral junctional   zones.   3. Normal MRI of the sacroiliac and hip joints, without MR signs of active inflammatory   arthropathy.      MRI R hip:  1.  Normal femoroacetabular bony morphology.   2.  Asymmetrical attritional volume loss, multifocal contour fraying, and intrasubstance mucoid   transformation of the anterosuperior segments of the acetabular vivi, collectively highly   indicative of recurrent mechanical labral impingement, without detached high-grade labral tears,   paralabral cyst formation, or delamination injuries of the anterosuperior chondrolabral junctional   zones.   3. Normal MRI of the sacroiliac and hip joints, without MR signs of active inflammatory     ASSESSMENT/PLAN:     Myofascial pain  - She reports diffuse joint and muscle pain for many years.  Also intermittent chest pain workup has been essentially normal.  Possible history of pericarditis in 2016 that improved with steroids  - Blood work negative for lupus, rheumatoid arthritis, inflammation.  No synovitis or swelling on exam.  No history of psoriasis  - She tried Cymbalta in the past which initially helped but stopped working  - She is on gabapentin through her milligrams at night.  It is helping some of her symptoms.  Plan to increasing to 300 mg twice a day    Lower back pain  - HLA-B27 was negative.  She does now on Flexeril started yesterday for a few days     S/p right hip surgery for labral impingement  - stable    Pt will f/u in 6 mos    There is a longitudinal care relationship with me, the care plan reflects the ongoing nature of the continuous relationship of  care, and the medical record indicates that there is ongoing treatment of a serious/complex medical condition which I am currently managing.  is Applicable.     Hali Francis MD  9/11/2024   10:01 AM

## 2024-10-08 ENCOUNTER — MED REC SCAN ONLY (OUTPATIENT)
Dept: INTERNAL MEDICINE CLINIC | Facility: CLINIC | Age: 29
End: 2024-10-08

## 2024-10-23 ENCOUNTER — TELEMEDICINE (OUTPATIENT)
Dept: SURGERY | Facility: CLINIC | Age: 29
End: 2024-10-23
Payer: COMMERCIAL

## 2024-10-23 VITALS — BODY MASS INDEX: 34.57 KG/M2 | HEIGHT: 64.5 IN | WEIGHT: 205 LBS

## 2024-10-23 DIAGNOSIS — M79.7 FIBROMYALGIA: ICD-10-CM

## 2024-10-23 DIAGNOSIS — I10 HYPERTENSION, UNSPECIFIED TYPE: ICD-10-CM

## 2024-10-23 DIAGNOSIS — E78.5 DYSLIPIDEMIA: Primary | ICD-10-CM

## 2024-10-23 DIAGNOSIS — Z51.81 ENCOUNTER FOR THERAPEUTIC DRUG MONITORING: ICD-10-CM

## 2024-10-23 DIAGNOSIS — E66.9 OBESITY (BMI 30-39.9): ICD-10-CM

## 2024-10-23 PROCEDURE — 99204 OFFICE O/P NEW MOD 45 MIN: CPT | Performed by: NURSE PRACTITIONER

## 2024-10-23 RX ORDER — SEMAGLUTIDE 0.25 MG/.5ML
0.25 INJECTION, SOLUTION SUBCUTANEOUS WEEKLY
Qty: 4 EACH | Refills: 1 | Status: SHIPPED | OUTPATIENT
Start: 2024-10-23

## 2024-10-23 NOTE — PATIENT INSTRUCTIONS
Add Magnesium glycinate 200 to 500 mg/day for sleep.   Life Extension. NOW. Garden of Life. Whole Food Brand. ZoniaRuradha's. Pure Encapsulations.     Or consider Natural Calm.   by Natural Vitality  Follow dosage instructions.  Start 1 teaspoon and increase up to 3 teaspoons as needed for sleep.    Aim for 75 grams protein/day.  25-30 grams/meal.   3 PM nuts/seeds.   Eat within 1-3 hours upon waking.   Meals between 7 or 9 AM and 7 PM.   6 days on, 1 day off.   Start tracking.  Add psyllium husk daily. [employer].   Build up to 35-40 grams of fiber/day.   Aim for 64 oz of water/day.    Aim for a total of:  2 fruits a day (avocado, tomato, citrus/oranges, apples, berries)  1/2 cup or medium size    4 non-starchy vegetables/day (1/2 cup cooked; 1 cup raw) (greens, peppers, onions, garlic, broccoli, cauliflower, brussels sprouts, asparagus, etc.)    0-2 starches/day (oatmeal, sweet potato, carrots, brown rice, etc).    3 protein per day (fish, seafood, meat, or plants: salmon, nuts, seeds, shrimp, chicken, turkey, beans, lentils, chickpeas, etc).    2 healthy fats (avocado, avocado oil, olives, olive oil, salmon, nuts, seeds)    I recommend a whole food, plant powered diet with low glycemic index:     Aim for 3 meals a day and 1-2 snacks as needed.    Aim for a protein + produce at each meal time.     Breakfast ideas:  1. Fruit and nuts/seeds.  2. Eggs scrambled with vegetables.  3. Oatmeal (stovetop), cinnamon, 2 tbsp flaxseed, berries, nuts.  4. Protein shake + fruit. (1 scoop with water/ice). Garden of Life Fit, Nutiva, Mcfarlane, and Orgain are good brands.      Snacks:  Raw vegetables and hummus, apples and peanut butter, nuts, seeds, fruit, pecans drizzled with organic honey.      Use the Healthy Plate method for lunch and dinner:  1/2 right side of plate non-starchy vegetables.  Bottom left 1/4 plate protein.  Top left 1/4 starch as desired.      Aim for 150 minutes moderate level exercise weekly with 2-3 days  strength training.

## 2024-10-23 NOTE — PROGRESS NOTES
Virtual Video & Audio Check-In    Ruth Garcia verbally consents to a Virtual/Telephone Check-In visit on 10/23/24.  Patient has been referred to the formerly [facility] website at [email or web address] to review the yearly Consent to Treat document.    Patient understands and accepts financial responsibility for any deductible, co-insurance and/or co-pays associated with this service.    Duration of the face to face service: 41 minutes    Summary of topics discussed: Obesity/weight management, Lifestyle and behavior modifications, Medication management.      The Wellness and Weight Loss Consultation Note       Date of Consult:  10/23/2024    Patient:  Ruth Garcia  :      1995  MRN:      [id number]    Referring Provider: Dr. Chin    Chief Complaint:    Chief Complaint   Patient presents with    Consult    Obesity    Weight Management       SUBJECTIVE     History of Present Illness:  Ruth Garcia has been referred to me for evaluation and treatment.       30 yo female.  Presents to clinic for assistance with weight loss/maintenance.   Patient states she is unsure why she has gained weight.   Hx HTN, now on medication.     Patient is considering medications and does not currently qualify for bariatric surgery for weight loss.    Patient denies any history of eating disorder(s).    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 In [employer].  Patient lives with boyfriend.    Patient's goal weight: 165 lbs  Biggest weight loss in the past: 20-30 lbs  How weight loss was achieved: WW  Heaviest weight ever: 208 lbs   Previous use of medical weight loss medications: None     Physical activity: Home Pilates   Limited due to hip surgery    Sleep: adequate hours/night, does not feel rested   Sleep screening:       Past Medical History:   Past Medical History:    Fibromyalgia       OBJECTIVE     Vitals: Ht 5' 4.5\" (1.638 m)   Wt 205 lb (93 kg)   LMP 2024 (Approximate)   BMI 34.64 kg/m²        Wt  Readings from Last 6 Encounters:   10/23/24 205 lb (93 kg)   09/11/24 203 lb (92.1 kg)   09/04/24 203 lb 9.6 oz (92.4 kg)   06/06/24 200 lb 6 oz (90.9 kg)   05/20/24 208 lb (94.3 kg)   05/15/24 207 lb 12.8 oz (94.3 kg)        Patient Medications:    Current Outpatient Medications   Medication Sig Dispense Refill    semaglutide-weight management (WEGOVY) 0.25 MG/0.5ML Subcutaneous Solution Auto-injector Inject 0.5 mL (0.25 mg total) into the skin once a week. 4 each 1    gabapentin 300 MG Oral Cap Take 1 capsule (300 mg total) by mouth in the morning and 1 capsule (300 mg total) before bedtime. 180 capsule 1    lisinopril 40 MG Oral Tab Take 1 tablet (40 mg total) by mouth daily.      Cholecalciferol (VITAMIN D) 50 MCG (2000 UT) Oral Tab Take by mouth.      Drospirenone (SLYND) 4 MG Oral Tab Take 1 tablet by mouth daily. 84 tablet 4    Magnesium 500 MG Oral Cap Take by mouth.      nitroglycerin 0.4 MG Sublingual SL Tab Place 1 tablet (0.4 mg total) under the tongue every 5 (five) minutes as needed for Chest pain. May repeat two times. If not better then head to ER 30 tablet 1    triamcinolone acetonide 0.1 % External Ointment Apply thin layer to affected skin of bilateral arms nightly. Do not apply to face, underarms, groin or to normal skin. 80 g 0       Allergies:  Seasonal     Comorbidities:  HTN, Dyslipidemia     Social History:  Reviewed     Surgical History:    Past Surgical History:   Procedure Laterality Date    Other surgical history Right     arthroscopic hip surgery for torn labrum       Family History:    Family History   Problem Relation Age of Onset    Hypertension Maternal Grandmother     Heart Disease Maternal Grandmother     Heart Disease Maternal Grandfather     Hypertension Maternal Grandfather     Blood Cancer Maternal Grandfather        Typical Dietary Intake:  Breakfast AM Snack Lunch PM Snack Dinner   May skip    Yogurt, jolene seeds    Toast     Breakfast sandwich   None  Left over dinner      [place]    Salad  Granola bar    Fruit    Chips  Well balanced    Lean meat  Vegetables  Starch       +snacker  After dinner behavior: -  Night eating: -  Portion sizes: +  Binge: -  Emotional: + stress occasional  Depression:  Grazing: + afternoon  Sweet tooth: +  Crunchy/salty: +  Etoh:  At least 48 oz/day water  Soda Drinker: Yes  If yes, how much?:  rare  Sports Drinks:  No    Juice:  No      Number of restaurant or fast food meals/week:  Rare meals/week    Nutritional Goals Reviewed and Discussed:     Eat 3-4 cups of fresh fruit or vegetables daily    Behavior Modifications Reviewed and Discussed:    Eat breakfast, Eat 3 meals per day, Plan meals in advance, Read nutrition labels, Drink 64oz of water per day, Maintain a daily food journal, Utilize portion control strategies to reduce calorie intake, Identify triggers for eating and manage cues, and Eat slowly and take 20 to 30 minutes to complete each meal      ROS:  Constitutional: negative  Respiratory: negative  Cardiovascular: positive for chest pain and fibromyalgia related  Gastrointestinal: positive for reflux symptoms  Integument/breast:  eczema  Hematologic/lymphatic: negative  Musculoskeletal:positive for back pain and fibromyalgia  Neurological: negative  Behavioral/Psych: negative  Endocrine: negative    Physical Exam:  General: alert, oriented x 3, cooperative, speaking in full sentences, appears stated age and cooperative, obese   Head: Normocephalic, without obvious abnormality, atraumatic  Neck: symmetrical, trachea midline   Lungs: No increased work of breathing   Extremities: extremities normal  Skin: Upper body skin color and texture appear intact       ASSESSMENT       Encounter Diagnosis(ses):   1. Dyslipidemia    2. Hypertension, unspecified type    3. Obesity (BMI 30-39.9)    4. Encounter for therapeutic drug monitoring    5. Fibromyalgia        PLAN         Diagnoses and all orders for this visit:    Dyslipidemia  -     DIETITIAN  EDUCATION INITIAL, DIET (INTERNAL)    Hypertension, unspecified type  -     DIETITIAN EDUCATION INITIAL, DIET (INTERNAL)    Obesity (BMI 30-39.9)  -     DIETITIAN EDUCATION INITIAL, DIET (INTERNAL)  -     semaglutide-weight management (WEGOVY) 0.25 MG/0.5ML Subcutaneous Solution Auto-injector; Inject 0.5 mL (0.25 mg total) into the skin once a week.    Encounter for therapeutic drug monitoring  -     DIETITIAN EDUCATION INITIAL, DIET (INTERNAL)    Fibromyalgia  -     DIETITIAN EDUCATION INITIAL, DIET (INTERNAL)      DYSLIPIDEMIA: Recommend dietary changes and lifestyle modifications as discussed below. Monitor.       Lab Results   Component Value Date/Time    CHOLEST 179 08/24/2024 11:00 AM     (H) 08/24/2024 11:00 AM    HDL 49 08/24/2024 11:00 AM    TRIG 57 08/24/2024 11:00 AM    VLDL 10 08/24/2024 11:00 AM       OBESITY/WEIGHT GAIN:    Recommended intensive lifestyle and behavioral modifications at this time for weight loss.    Educated patient on lifestyle modifications: Whole Food/Plant Strong/Low Glycemic Index diet, moderate alcohol consumption, reduced sodium intake to no more than 2,400 mg/day, and at least 150 minutes of moderate physical activity per week.   Avoid processed, poor quality carbohydrates, refined grains, flour, sugar.    Goals for next month:  1. Keep a food log.   2. Drink 64 ounces of non-caloric beverages per day. No fruit juices or regular soda.  3. Aim for 150 minutes moderate exercise per week.    4. Increase fruit and vegetable servings to 5-6 per day.    5. Improve sleep and stress.     Reviewed other labs:  8/26/24- thyroid studies, CBC, a1c (4.5), CMP, D in range.     Discussed medication options for weight loss in detail with patient.     Denies personal or family hx medullary thyroid CA, endocrine neoplasia syndrome, pancreatitis hx, suicidal ideation. No renal impairment, severe GI disease, diabetes, pancreatitis risks noted.    Will check Wegovy coverage.   Start Wegovy  0.25 mg weekly. Increase dose monthly as tolerated.    Alternative: Qsymia.    SQ administration teaching provided to patient.   Discussed risks, benefits, and side effects of medication. Avoid pregnancy during use. Contraindications for medication discussed at length. Patient states understanding.     Meet with RD.  Healthy Plate Method.   Tract protein grams/day.    RTC 2-3 months.     I spent 45 minutes preparing chart, obtaining/reviewing pertinent history, performing medically appropriate examination/evaluations, counseling/educating on assessment and plan, ordering and reviewing tests/medications as needed, referring/communicating with other health care professionals as needed, documenting clinical information, interpreting results, communicating results, and/or coordinating patient care.     Zully Fisher, APRN

## 2024-11-04 ENCOUNTER — OFFICE VISIT (OUTPATIENT)
Age: 29
End: 2024-11-04
Payer: COMMERCIAL

## 2024-11-04 VITALS
SYSTOLIC BLOOD PRESSURE: 111 MMHG | OXYGEN SATURATION: 99 % | WEIGHT: 208.63 LBS | BODY MASS INDEX: 34.34 KG/M2 | HEART RATE: 93 BPM | DIASTOLIC BLOOD PRESSURE: 75 MMHG | HEIGHT: 65.4 IN

## 2024-11-04 DIAGNOSIS — M79.7 FIBROMYALGIA: ICD-10-CM

## 2024-11-04 DIAGNOSIS — M25.551 RIGHT HIP PAIN: ICD-10-CM

## 2024-11-04 DIAGNOSIS — Z51.81 ENCOUNTER FOR THERAPEUTIC DRUG MONITORING: ICD-10-CM

## 2024-11-04 DIAGNOSIS — E66.811 OBESITY, CLASS I, BMI 30-34.9: ICD-10-CM

## 2024-11-04 DIAGNOSIS — E78.5 DYSLIPIDEMIA: ICD-10-CM

## 2024-11-04 DIAGNOSIS — I10 HYPERTENSION, UNSPECIFIED TYPE: Primary | ICD-10-CM

## 2024-11-04 PROCEDURE — 99214 OFFICE O/P EST MOD 30 MIN: CPT | Performed by: INTERNAL MEDICINE

## 2024-11-04 NOTE — PROGRESS NOTES
CC:   Chief Complaint   Patient presents with    Follow - Up    Weight Management        Referring Physician to whom this note will be reported back to: Andrea Szymanski MD   Reason for medical consultation: Medical Management of Weight and Weight related comorbidities.      HPI:   - The patient participated in a comprehensive weight management program that encourages behavioral modification, reduced calorie diet and increased physical activity with continuing follow up for at least 6 months prior to using drug therapy.    Baseline 190 lbs, recently increased due to ?change in birth control.     -obesity started at 19, ?related to OCP    Body mass index is 34.29 kg/m².   Wt Readings from Last 6 Encounters:   11/04/24 208 lb 9.6 oz (94.6 kg)   10/23/24 205 lb (93 kg)   09/11/24 203 lb (92.1 kg)   09/04/24 203 lb 9.6 oz (92.4 kg)   06/06/24 200 lb 6 oz (90.9 kg)   05/20/24 208 lb (94.3 kg)     /75   Pulse 93   Ht 5' 5.4\" (1.661 m)   Wt 208 lb 9.6 oz (94.6 kg)   LMP 05/06/2024 (Approximate)   SpO2 99%   BMI 34.29 kg/m²   Vitals:    11/04/24 1037   BP: 111/75   Pulse: 93     Body mass index is 34.29 kg/m².  Waist Circumference: 44 inches       LABS and RESULTS:     [facility] Lab Encounter on 08/24/2024   Component Date Value    Glucose 08/24/2024 91     Sodium 08/24/2024 142     Potassium 08/24/2024 4.2     Chloride 08/24/2024 111     CO2 08/24/2024 24.0     Anion Gap 08/24/2024 7     BUN 08/24/2024 15     Creatinine 08/24/2024 0.82     BUN/CREA Ratio 08/24/2024 18.3     Calcium, Total 08/24/2024 9.6     Calculated Osmolality 08/24/2024 294     eGFR-Cr 08/24/2024 100     ALT 08/24/2024 11     AST 08/24/2024 15     Alkaline Phosphatase 08/24/2024 58     Bilirubin, Total 08/24/2024 1.1     Total Protein 08/24/2024 6.9     Albumin 08/24/2024 4.3     Globulin  08/24/2024 2.6     A/G Ratio 08/24/2024 1.7     Patient Fasting for CMP? 08/24/2024 Yes     Cholesterol, Total 08/24/2024 179     HDL Cholesterol 08/24/2024 49      Triglycerides 08/24/2024 57     LDL Cholesterol 08/24/2024 119 (H)     VLDL 08/24/2024 10     Non HDL Chol 08/24/2024 130 (H)     Patient Fasting for Lipi* 08/24/2024 Yes     HgbA1C 08/24/2024 4.5     Estimated Average Glucose 08/24/2024 82     TSH 08/24/2024 1.811     WBC 08/24/2024 6.2     RBC 08/24/2024 4.02     HGB 08/24/2024 12.8     HCT 08/24/2024 38.4     MCV 08/24/2024 95.5     MCH 08/24/2024 31.8     MCHC 08/24/2024 33.3     RDW 08/24/2024 12.7     RDW-SD 08/24/2024 44.0     PLT 08/24/2024 260.0     Free T4 08/24/2024 1.2     T3 Free 08/24/2024 3.65     Quantiferon TB NIL 08/24/2024 0.00     Quantiferon TB1 minus NIL 08/24/2024 0.00     Quantiferon TB2 minus NIL 08/24/2024 0.02     Quantiferon TB Mitogen m* 08/24/2024 >10.00     Quantiferon TB Result 08/24/2024 Negative     Vitamin D, 25OH, Total 08/24/2024 58.6    Office Visit on 05/15/2024   Component Date Value    Thin Prep Pap 05/15/2024 AP TEST REFLEXED     Diagnosis:  05/15/2024 NEGATIVE FOR INTRAEPITHELIAL LESION OR MALIGNANCY.  REACTIVE CELLULAR CHANGES AND/OR REPAIR ARE PRESENT.     Specimen Adequacy: 05/15/2024 Comment     Performed By: 05/15/2024 Comment     Electronically Signed By: 05/15/2024 Comment     . 05/15/2024 .     Note: 05/15/2024 Comment     Test Methodology: 05/15/2024 Comment     Clinician provided ICD10: 05/15/2024 Comment        Current Outpatient Medications   Medication Sig Dispense Refill    CUSTOM MEDICATION Semaglutide/cyanocobalamin injection    Concentration: 1 mg/ 0.5 mL   Amount: 2.5 ML vial with supplies  Instructions: Inject 0.25 mL weekly 1 each 0    gabapentin 300 MG Oral Cap Take 1 capsule (300 mg total) by mouth in the morning and 1 capsule (300 mg total) before bedtime. 180 capsule 1    lisinopril 40 MG Oral Tab Take 1 tablet (40 mg total) by mouth daily.      Cholecalciferol (VITAMIN D) 50 MCG (2000 UT) Oral Tab Take by mouth.      Drospirenone (SLYND) 4 MG Oral Tab Take 1 tablet by mouth daily. 84 tablet 4     Magnesium 500 MG Oral Cap Take by mouth.      nitroglycerin 0.4 MG Sublingual SL Tab Place 1 tablet (0.4 mg total) under the tongue every 5 (five) minutes as needed for Chest pain. May repeat two times. If not better then head to ER 30 tablet 1    triamcinolone acetonide 0.1 % External Ointment Apply thin layer to affected skin of bilateral arms nightly. Do not apply to face, underarms, groin or to normal skin. 80 g 0      Past Medical History:    Fibromyalgia       Past Surgical History:   Procedure Laterality Date    Other surgical history Right     arthroscopic hip surgery for torn labrum       Social History:  Social History     Socioeconomic History    Marital status: Single   Tobacco Use    Smoking status: Never    Smokeless tobacco: Never   Vaping Use    Vaping status: Never Used   Substance and Sexual Activity    Alcohol use: Yes    Drug use: No    Sexual activity: Yes     Birth control/protection: OCP     Social Drivers of Health      [facility]    Housing Stability     Family History:  Family History   Problem Relation Age of Onset    Hypertension Maternal Grandmother     Heart Disease Maternal Grandmother     Heart Disease Maternal Grandfather     Hypertension Maternal Grandfather     Blood Cancer Maternal Grandfather           REVIEW OF SYSTEMS:   10 point review of systems otherwise negative.  With the exception of HPI and assessment and plan        EXAM:     GENERAL: NAD, conversant  SKIN: good turgor, no jaundice  HEENT: nl external nose and ears  NECK: supple  LUNGS: nl effort, clear to auscultation bilaterally  CARDIO: RRR, no murmur  ABDOMEN: NT/ND, no masses  EXTREMITIES: gait normal, no edema   PSYCH: Oriented times three, appropriate affect              ASSESSMENT AND PLAN:   1. Hypertension, unspecified type  2. Dyslipidemia  3. Fibromyalgia  4. Obesity, Class I, BMI 30-34.9  5. Encounter for therapeutic drug monitoring  6. Right hip pain  - Initial weight 208  lb 9.6 oz (94.6 kg), Initial Body mass index is 34.29 kg/m².  - Hypertension, started on medications 2.5 years ago, never worked up for secondary hypertension  -seen rosalba mcguire and was recommended to start wegovy, not covered, interested in compounding medications  -The patient participated in a comprehensive weight management program that encourages behavioral modification, reduced calorie diet and increased physical activity with continuing follow up for at least 6 months prior to using drug therapy.  - patient is interested in GLP1 medications, patient denies any personal or family history of MTC or in patients with Multiple Endocrine Neoplasia syndrome type 2 (MEN 2). Counseled patient regarding the potential risk for MTC with the use of semaglutide and inform them of symptoms of thyroid tumors (eg, a mass in the neck, dysphagia, dyspnea, persistent hoarseness).     Plan:  - CUSTOM MEDICATION; Semaglutide/cyanocobalamin injection    Concentration: 1 mg/ 0.5 mL   Amount: 2.5 ML vial with supplies  Instructions: Inject 0.25 mL weekly  Dispense: 1 each; Refill: 0    Regarding Obesity:  Follow up with dietitian and psychologist as recommended.  Discussed the role of sleep and stress in weight management.  Labs orders as above.  Counseled on comprehensive weight loss plan including attention to nutrition, exercise and behavior/stress management for success. See patient instruction below for more details.  Weight Loss Consent to treat reviewed and signed.    Regarding weight loss Medications.  Medication use and side effects reviewed with patient.    Medication will be used with a reduced calorie diet and increased physical activity in the management of exogenous obesity.  Patient will be responsible to let me know of any side effects or complications with medications.               Return in about 4 weeks (around 12/2/2024).     Jessica Reinoso MD

## 2024-11-05 ENCOUNTER — TELEPHONE (OUTPATIENT)
Dept: OBGYN CLINIC | Facility: CLINIC | Age: 29
End: 2024-11-05

## 2024-11-05 RX ORDER — DROSPIRENONE 4 MG/1
1 TABLET, FILM COATED ORAL DAILY
Qty: 84 TABLET | Refills: 4 | Status: SHIPPED | OUTPATIENT
Start: 2024-11-05 | End: 2025-11-05

## 2024-11-05 NOTE — TELEPHONE ENCOUNTER
Received fax from [facility] stating patients plan does not cover Slynd 4mg tabs.   This RN spoke with patient who states she called her insurance plan who says that they cover SLYND, patient states she has not been able to  prescription from [facility]. Will attempt to resend prescription to pharm.  Patient aware.

## 2024-11-07 NOTE — TELEPHONE ENCOUNTER
Received request from [employer] for Prior Auth.   We have approval on file from May 2024.  Faxed approval back to pharmacy.

## 2024-11-21 ENCOUNTER — MED REC SCAN ONLY (OUTPATIENT)
Dept: INTERNAL MEDICINE CLINIC | Facility: CLINIC | Age: 29
End: 2024-11-21

## 2024-12-05 ENCOUNTER — OFFICE VISIT (OUTPATIENT)
Age: 29
End: 2024-12-05
Payer: COMMERCIAL

## 2024-12-05 ENCOUNTER — TELEPHONE (OUTPATIENT)
Dept: SURGERY | Facility: CLINIC | Age: 29
End: 2024-12-05

## 2024-12-05 VITALS
HEART RATE: 106 BPM | OXYGEN SATURATION: 98 % | BODY MASS INDEX: 32.43 KG/M2 | SYSTOLIC BLOOD PRESSURE: 108 MMHG | HEIGHT: 65.4 IN | DIASTOLIC BLOOD PRESSURE: 72 MMHG | WEIGHT: 197 LBS

## 2024-12-05 DIAGNOSIS — I10 HYPERTENSION, UNSPECIFIED TYPE: Primary | ICD-10-CM

## 2024-12-05 DIAGNOSIS — Z51.81 THERAPEUTIC DRUG MONITORING: ICD-10-CM

## 2024-12-05 DIAGNOSIS — E78.5 DYSLIPIDEMIA: ICD-10-CM

## 2024-12-05 DIAGNOSIS — Z51.81 ENCOUNTER FOR THERAPEUTIC DRUG MONITORING: ICD-10-CM

## 2024-12-05 DIAGNOSIS — E66.811 OBESITY, CLASS I, BMI 30-34.9: ICD-10-CM

## 2024-12-05 PROCEDURE — 99214 OFFICE O/P EST MOD 30 MIN: CPT | Performed by: INTERNAL MEDICINE

## 2024-12-05 NOTE — PROGRESS NOTES
CC:   Chief Complaint   Patient presents with    Follow - Up    Weight Management        Referring Physician to whom this note will be reported back to: Andrea Szymanski MD   Reason for medical consultation: Medical Management of Weight and Weight related comorbidities.      HPI:     Initial weight: 208 lbs 11/2024  Current weight: 197 lbs  Interval weight loss: 11 lbs  Total weight loss: 11 lbs    -some nausea, otherwise no side effects  -obesity started at 19, ?related to OCP    Body mass index is 32.38 kg/m².   Wt Readings from Last 6 Encounters:   12/05/24 197 lb (89.4 kg)   11/04/24 208 lb 9.6 oz (94.6 kg)   10/23/24 205 lb (93 kg)   09/11/24 203 lb (92.1 kg)   09/04/24 203 lb 9.6 oz (92.4 kg)   06/06/24 200 lb 6 oz (90.9 kg)     /72   Pulse 106   Ht 5' 5.4\" (1.661 m)   Wt 197 lb (89.4 kg)   LMP 05/06/2024 (Approximate)   SpO2 98%   BMI 32.38 kg/m²   Vitals:    12/05/24 1045   BP: 108/72   Pulse: 106     Body mass index is 32.38 kg/m².          LABS and RESULTS:     [facility] Lab Encounter on 08/24/2024   Component Date Value    Glucose 08/24/2024 91     Sodium 08/24/2024 142     Potassium 08/24/2024 4.2     Chloride 08/24/2024 111     CO2 08/24/2024 24.0     Anion Gap 08/24/2024 7     BUN 08/24/2024 15     Creatinine 08/24/2024 0.82     BUN/CREA Ratio 08/24/2024 18.3     Calcium, Total 08/24/2024 9.6     Calculated Osmolality 08/24/2024 294     eGFR-Cr 08/24/2024 100     ALT 08/24/2024 11     AST 08/24/2024 15     Alkaline Phosphatase 08/24/2024 58     Bilirubin, Total 08/24/2024 1.1     Total Protein 08/24/2024 6.9     Albumin 08/24/2024 4.3     Globulin  08/24/2024 2.6     A/G Ratio 08/24/2024 1.7     Patient Fasting for CMP? 08/24/2024 Yes     Cholesterol, Total 08/24/2024 179     HDL Cholesterol 08/24/2024 49     Triglycerides 08/24/2024 57     LDL Cholesterol 08/24/2024 119 (H)     VLDL 08/24/2024 10     Non HDL Chol 08/24/2024 130 (H)     Patient Fasting for Lipi* 08/24/2024 Yes     HgbA1C 08/24/2024  4.5     Estimated Average Glucose 08/24/2024 82     TSH 08/24/2024 1.811     WBC 08/24/2024 6.2     RBC 08/24/2024 4.02     HGB 08/24/2024 12.8     HCT 08/24/2024 38.4     MCV 08/24/2024 95.5     MCH 08/24/2024 31.8     MCHC 08/24/2024 33.3     RDW 08/24/2024 12.7     RDW-SD 08/24/2024 44.0     PLT 08/24/2024 260.0     Free T4 08/24/2024 1.2     T3 Free 08/24/2024 3.65     Quantiferon TB NIL 08/24/2024 0.00     Quantiferon TB1 minus NIL 08/24/2024 0.00     Quantiferon TB2 minus NIL 08/24/2024 0.02     Quantiferon TB Mitogen m* 08/24/2024 >10.00     Quantiferon TB Result 08/24/2024 Negative     Vitamin D, 25OH, Total 08/24/2024 58.6    Office Visit on 05/15/2024   Component Date Value    Thin Prep Pap 05/15/2024 AP TEST REFLEXED     Diagnosis:  05/15/2024 NEGATIVE FOR INTRAEPITHELIAL LESION OR MALIGNANCY.  REACTIVE CELLULAR CHANGES AND/OR REPAIR ARE PRESENT.     Specimen Adequacy: 05/15/2024 Comment     Performed By: 05/15/2024 Comment     Electronically Signed By: 05/15/2024 Comment     . 05/15/2024 .     Note: 05/15/2024 Comment     Test Methodology: 05/15/2024 Comment     Clinician provided ICD10: 05/15/2024 Comment        Current Outpatient Medications   Medication Sig Dispense Refill    CUSTOM MEDICATION Semaglutide/cyanocobalamin injection (1 mg weekly)  Concentration: 5 mg/ 0.5 mL   Amount: 5 ML   Dispense: needles.  Instructions: Injection 0.10 mL/ weekly subcutaneous 2 each 0    Drospirenone (SLYND) 4 MG Oral Tab Take 1 tablet by mouth daily. 84 tablet 4    CUSTOM MEDICATION Semaglutide/cyanocobalamin injection    Concentration: 1 mg/ 0.5 mL   Amount: 2.5 ML vial with supplies  Instructions: Inject 0.25 mL weekly 1 each 0    gabapentin 300 MG Oral Cap Take 1 capsule (300 mg total) by mouth in the morning and 1 capsule (300 mg total) before bedtime. 180 capsule 1    lisinopril 40 MG Oral Tab Take 1 tablet (40 mg total) by mouth daily.      Cholecalciferol (VITAMIN D) 50 MCG (2000 UT) Oral Tab Take by mouth.       Magnesium 500 MG Oral Cap Take by mouth.      nitroglycerin 0.4 MG Sublingual SL Tab Place 1 tablet (0.4 mg total) under the tongue every 5 (five) minutes as needed for Chest pain. May repeat two times. If not better then head to ER 30 tablet 1    triamcinolone acetonide 0.1 % External Ointment Apply thin layer to affected skin of bilateral arms nightly. Do not apply to face, underarms, groin or to normal skin. 80 g 0      Past Medical History:    Fibromyalgia       Past Surgical History:   Procedure Laterality Date    Other surgical history Right     arthroscopic hip surgery for torn labrum       Social History:  Social History     Socioeconomic History    Marital status: Single   Tobacco Use    Smoking status: Never    Smokeless tobacco: Never   Vaping Use    Vaping status: Never Used   Substance and Sexual Activity    Alcohol use: Yes    Drug use: No    Sexual activity: Yes     Birth control/protection: OCP     Social Drivers of Health      Received from [facility]    Housing Stability     Family History:  Family History   Problem Relation Age of Onset    Hypertension Maternal Grandmother     Heart Disease Maternal Grandmother     Heart Disease Maternal Grandfather     Hypertension Maternal Grandfather     Blood Cancer Maternal Grandfather           REVIEW OF SYSTEMS:   10 point review of systems otherwise negative.  With the exception of HPI and assessment and plan        EXAM:     GENERAL: NAD, conversant  SKIN: good turgor, no jaundice  HEENT: nl external nose and ears  NECK: supple  LUNGS: nl effort, clear to auscultation bilaterally  CARDIO: RRR, no murmur  ABDOMEN: NT/ND, no masses  EXTREMITIES: gait normal, no edema   PSYCH: Oriented times three, appropriate affect              ASSESSMENT AND PLAN:   1. Hypertension, unspecified type  2. Dyslipidemia  3. Fibromyalgia  4. Obesity, Class I, BMI 30-34.9  5. Encounter for therapeutic drug monitoring  6. Right hip pain    - Initial weight  208 lb 9.6 oz (94.6 kg), Initial Body mass index is 32.38 kg/m².  - labs 8/2024  otherwise cbc, cmp, vit D, TSH wnl  - Hypertension, started on medications 2.5 years ago, seen cardiology, no need for secondary workup  -insurance does not cover GLP1  -The patient participated in a comprehensive weight management program that encourages behavioral modification, reduced calorie diet and increased physical activity with continuing follow up for at least 6 months prior to using drug therapy.  -SEMAGLUTIDE (Compounded): At the patient’s request, I am providing a prescription for compounded semaglutide to be used as part of the weight management plan. The patient is aware of the potential side effects, including but not limited to nausea, vomiting, diarrhea, and rare risks such as pancreatitis or gallbladder issues. The patient understands that the prescribing physician is not responsible for any side effects or issues arising from the compounded medication, as its preparation and quality are the sole responsibility of the compounding pharmacy. The patient also acknowledges the need for follow-up appointments for continued prescriptions and understands that medication refills will not be called in.  patient is interested in GLP1 medications, patient denies any personal or family history of MTC or in patients with Multiple Endocrine Neoplasia syndrome type 2 (MEN 2). Counseled patient regarding the potential risk for MTC with the use of semaglutide and inform them of symptoms of thyroid tumors (eg, a mass in the neck, dysphagia, dyspnea, persistent hoarseness).       Plan:  - increase semaglutide 1 mg weekly  - Medication will be used with a reduced calorie diet and increased physical activity in the management of exogenous obesity       Regarding Obesity:  Follow up with dietitian and psychologist as recommended.  Discussed the role of sleep and stress in weight management.  Labs orders as above.  Counseled on  comprehensive weight loss plan including attention to nutrition, exercise and behavior/stress management for success. See patient instruction below for more details.  Weight Loss Consent to treat reviewed and signed.    Regarding weight loss Medications.  Medication use and side effects reviewed with patient.    Medication will be used with a reduced calorie diet and increased physical activity in the management of exogenous obesity.  Patient will be responsible to let me know of any side effects or complications with medications.               Return in about 2 months (around 2/5/2025).     Jessica Reinoso MD     30

## 2024-12-17 ENCOUNTER — PATIENT MESSAGE (OUTPATIENT)
Age: 29
End: 2024-12-17

## 2024-12-23 RX ORDER — ONDANSETRON 4 MG/1
4 TABLET, FILM COATED ORAL EVERY 8 HOURS PRN
Qty: 20 TABLET | Refills: 2 | Status: SHIPPED | OUTPATIENT
Start: 2024-12-23

## 2025-02-10 ENCOUNTER — PATIENT MESSAGE (OUTPATIENT)
Dept: INTERNAL MEDICINE CLINIC | Facility: CLINIC | Age: 30
End: 2025-02-10

## 2025-02-11 ENCOUNTER — NURSE TRIAGE (OUTPATIENT)
Dept: INTERNAL MEDICINE CLINIC | Facility: CLINIC | Age: 30
End: 2025-02-11

## 2025-02-11 ENCOUNTER — OFFICE VISIT (OUTPATIENT)
Dept: INTERNAL MEDICINE CLINIC | Facility: CLINIC | Age: 30
End: 2025-02-11
Payer: COMMERCIAL

## 2025-02-11 ENCOUNTER — OFFICE VISIT (OUTPATIENT)
Dept: SURGERY | Facility: CLINIC | Age: 30
End: 2025-02-11
Payer: COMMERCIAL

## 2025-02-11 VITALS
WEIGHT: 187 LBS | BODY MASS INDEX: 30.78 KG/M2 | HEART RATE: 101 BPM | DIASTOLIC BLOOD PRESSURE: 78 MMHG | SYSTOLIC BLOOD PRESSURE: 112 MMHG | HEIGHT: 65.4 IN | OXYGEN SATURATION: 97 %

## 2025-02-11 VITALS
BODY MASS INDEX: 31.01 KG/M2 | SYSTOLIC BLOOD PRESSURE: 110 MMHG | DIASTOLIC BLOOD PRESSURE: 70 MMHG | HEIGHT: 65.4 IN | HEART RATE: 99 BPM | WEIGHT: 188.38 LBS

## 2025-02-11 DIAGNOSIS — I10 PRIMARY HYPERTENSION: Primary | ICD-10-CM

## 2025-02-11 DIAGNOSIS — Z51.81 THERAPEUTIC DRUG MONITORING: ICD-10-CM

## 2025-02-11 DIAGNOSIS — E66.811 OBESITY, CLASS I, BMI 30-34.9: ICD-10-CM

## 2025-02-11 DIAGNOSIS — Z51.81 ENCOUNTER FOR THERAPEUTIC DRUG MONITORING: ICD-10-CM

## 2025-02-11 DIAGNOSIS — M79.7 FIBROMYALGIA: ICD-10-CM

## 2025-02-11 DIAGNOSIS — I10 HYPERTENSION, UNSPECIFIED TYPE: Primary | ICD-10-CM

## 2025-02-11 DIAGNOSIS — E78.5 DYSLIPIDEMIA: ICD-10-CM

## 2025-02-11 PROCEDURE — 99214 OFFICE O/P EST MOD 30 MIN: CPT | Performed by: NURSE PRACTITIONER

## 2025-02-11 PROCEDURE — 99213 OFFICE O/P EST LOW 20 MIN: CPT | Performed by: INTERNAL MEDICINE

## 2025-02-11 RX ORDER — LISINOPRIL 10 MG/1
10 TABLET ORAL DAILY
Qty: 30 TABLET | Refills: 2 | Status: SHIPPED | OUTPATIENT
Start: 2025-02-11

## 2025-02-11 NOTE — PATIENT INSTRUCTIONS
Magnesium Glycinate- 250 mg- 500mg  Monday, Wednesday, Friday    Foods with Magnesium  Brown Rice, avocados, spinach, flaco, oatmeal, navy beans, lima beans, broccoli, yogurt, banannas, baked potatoes, apples, apricots, ling's yeast, tofu, cantaloupes, grapefruit, green leafy vegetables, marcelo, nuts, salmon, and wheat.     Low Salt Diet    Follow up in one Month

## 2025-02-11 NOTE — ASSESSMENT & PLAN NOTE
Blood pressure 110/70, pulse 99, height 5' 5.4\" (1.661 m), weight 188 lb 6.4 oz (85.5 kg), last menstrual period 05/06/2024, not currently breastfeeding.     Patient has lost weight and her blood pressure has been running lower.    She has been Dizzy at work.    Plan  Magnesium Glycinate- 250 mg- 500mg  Monday, Wednesday, Friday- Night  Decrease Lisinopril to 10mg every morning.  She will monitor her blood pressure and return in 1 month.    Foods with Magnesium  Brown Rice, avocados, spinach, flaco, oatmeal, navy beans, lima beans, broccoli, yogurt, banannas, baked potatoes, apples, apricots, ling's yeast, tofu, cantaloupes, grapefruit, green leafy vegetables, marcelo, nuts, salmon, and wheat.     Low Salt Diet    Follow up in one Month

## 2025-02-11 NOTE — PROGRESS NOTES
CC:   Chief Complaint   Patient presents with    Follow - Up    Weight Management        Referring Physician to whom this note will be reported back to: Andrea Szymanski MD   Reason for medical consultation: Medical Management of Weight and Weight related comorbidities.      HPI:     Initial weight: 208 lbs 11/2024  Current weight: 187 lbs  Interval weight loss: 10 lbs  Total weight loss: 21 lbs    -some nausea, otherwise no side effects      Body mass index is 30.74 kg/m².   Wt Readings from Last 6 Encounters:   02/11/25 187 lb (84.8 kg)   12/05/24 197 lb (89.4 kg)   11/04/24 208 lb 9.6 oz (94.6 kg)   10/23/24 205 lb (93 kg)   09/11/24 203 lb (92.1 kg)   09/04/24 203 lb 9.6 oz (92.4 kg)     /78   Pulse 101   Ht 5' 5.4\" (1.661 m)   Wt 187 lb (84.8 kg)   LMP 05/06/2024 (Approximate)   SpO2 97%   BMI 30.74 kg/m²   Vitals:    02/11/25 0810   BP: 112/78   Pulse: 101     Body mass index is 30.74 kg/m².          LABS and RESULTS:     [facility] Lab Encounter on 08/24/2024   Component Date Value    Glucose 08/24/2024 91     Sodium 08/24/2024 142     Potassium 08/24/2024 4.2     Chloride 08/24/2024 111     CO2 08/24/2024 24.0     Anion Gap 08/24/2024 7     BUN 08/24/2024 15     Creatinine 08/24/2024 0.82     BUN/CREA Ratio 08/24/2024 18.3     Calcium, Total 08/24/2024 9.6     Calculated Osmolality 08/24/2024 294     eGFR-Cr 08/24/2024 100     ALT 08/24/2024 11     AST 08/24/2024 15     Alkaline Phosphatase 08/24/2024 58     Bilirubin, Total 08/24/2024 1.1     Total Protein 08/24/2024 6.9     Albumin 08/24/2024 4.3     Globulin  08/24/2024 2.6     A/G Ratio 08/24/2024 1.7     Patient Fasting for CMP? 08/24/2024 Yes     Cholesterol, Total 08/24/2024 179     HDL Cholesterol 08/24/2024 49     Triglycerides 08/24/2024 57     LDL Cholesterol 08/24/2024 119 (H)     VLDL 08/24/2024 10     Non HDL Chol 08/24/2024 130 (H)     Patient Fasting for Lipi* 08/24/2024 Yes     HgbA1C 08/24/2024 4.5     Estimated Average Glucose 08/24/2024  82     TSH 08/24/2024 1.811     WBC 08/24/2024 6.2     RBC 08/24/2024 4.02     HGB 08/24/2024 12.8     HCT 08/24/2024 38.4     MCV 08/24/2024 95.5     MCH 08/24/2024 31.8     MCHC 08/24/2024 33.3     RDW 08/24/2024 12.7     RDW-SD 08/24/2024 44.0     PLT 08/24/2024 260.0     Free T4 08/24/2024 1.2     T3 Free 08/24/2024 3.65     Quantiferon TB NIL 08/24/2024 0.00     Quantiferon TB1 minus NIL 08/24/2024 0.00     Quantiferon TB2 minus NIL 08/24/2024 0.02     Quantiferon TB Mitogen m* 08/24/2024 >10.00     Quantiferon TB Result 08/24/2024 Negative     Vitamin D, 25OH, Total 08/24/2024 58.6    Office Visit on 05/15/2024   Component Date Value    Thin Prep Pap 05/15/2024 AP TEST REFLEXED     Diagnosis:  05/15/2024 NEGATIVE FOR INTRAEPITHELIAL LESION OR MALIGNANCY.  REACTIVE CELLULAR CHANGES AND/OR REPAIR ARE PRESENT.     Specimen Adequacy: 05/15/2024 Comment     Performed By: 05/15/2024 Comment     Electronically Signed By: 05/15/2024 Comment     . 05/15/2024 .     Note: 05/15/2024 Comment     Test Methodology: 05/15/2024 Comment     Clinician provided ICD10: 05/15/2024 Comment        Current Outpatient Medications   Medication Sig Dispense Refill    ondansetron (ZOFRAN) 4 mg tablet Take 1 tablet (4 mg total) by mouth every 8 (eight) hours as needed for Nausea. 20 tablet 2    CUSTOM MEDICATION Semaglutide/cyanocobalamin injection (1 mg weekly)  Concentration: 5 mg/ 0.5 mL   Amount: 5 ML   Dispense: needles.  Instructions: Injection 0.10 mL/ weekly subcutaneous 2 each 0    Drospirenone (SLYND) 4 MG Oral Tab Take 1 tablet by mouth daily. 84 tablet 4    gabapentin 300 MG Oral Cap Take 1 capsule (300 mg total) by mouth in the morning and 1 capsule (300 mg total) before bedtime. 180 capsule 1    lisinopril 40 MG Oral Tab Take 1 tablet (40 mg total) by mouth daily.      Cholecalciferol (VITAMIN D) 50 MCG (2000 UT) Oral Tab Take by mouth.      Magnesium 500 MG Oral Cap Take by mouth.      nitroglycerin 0.4 MG Sublingual SL Tab  Place 1 tablet (0.4 mg total) under the tongue every 5 (five) minutes as needed for Chest pain. May repeat two times. If not better then head to ER 30 tablet 1    triamcinolone acetonide 0.1 % External Ointment Apply thin layer to affected skin of bilateral arms nightly. Do not apply to face, underarms, groin or to normal skin. 80 g 0      Past Medical History:    Fibromyalgia       Past Surgical History:   Procedure Laterality Date    Other surgical history Right     arthroscopic hip surgery for torn labrum       Social History:  Social History     Socioeconomic History    Marital status: Single   Tobacco Use    Smoking status: Never    Smokeless tobacco: Never   Vaping Use    Vaping status: Never Used   Substance and Sexual Activity    Alcohol use: Yes    Drug use: No    Sexual activity: Yes     Birth control/protection: OCP     Social Drivers of Health      Received from [facility]    Housing Stability     Family History:  Family History   Problem Relation Age of Onset    Hypertension Maternal Grandmother     Heart Disease Maternal Grandmother     Heart Disease Maternal Grandfather     Hypertension Maternal Grandfather     Blood Cancer Maternal Grandfather           REVIEW OF SYSTEMS:   10 point review of systems otherwise negative.  With the exception of HPI and assessment and plan        EXAM:     GENERAL: NAD, conversant  SKIN: good turgor, no jaundice  HEENT: nl external nose and ears  NECK: supple  LUNGS: nl effort, clear to auscultation bilaterally  CARDIO: RRR, no murmur  ABDOMEN: NT/ND, no masses  EXTREMITIES: gait normal, no edema   PSYCH: Oriented times three, appropriate affect              ASSESSMENT AND PLAN:   1. Hypertension, unspecified type  2. Dyslipidemia  3. Fibromyalgia  4. Obesity, Class I, BMI 30-34.9  5. Encounter for therapeutic drug monitoring  6. Right hip pain    - Initial weight 208 lb 9.6 oz (94.6 kg), Initial Body mass index is 32.38 kg/m².  -obesity started at  19, ?related to OCP  - labs 8/2024  otherwise cbc, cmp, vit D, TSH wnl  - Hypertension, started on medications 2.5 years ago, seen cardiology, no need for secondary workup  -insurance does not cover GLP1  -The patient participated in a comprehensive weight management program that encourages behavioral modification, reduced calorie diet and increased physical activity with continuing follow up for at least 6 months prior to using drug therapy.  -SEMAGLUTIDE (Compounded): At the patient’s request, I am providing a prescription for compounded semaglutide to be used as part of the weight management plan. The patient is aware of the potential side effects, including but not limited to nausea, vomiting, diarrhea, and rare risks such as pancreatitis or gallbladder issues. The patient understands that the prescribing physician is not responsible for any side effects or issues arising from the compounded medication, as its preparation and quality are the sole responsibility of the compounding pharmacy. The patient also acknowledges the need for follow-up appointments for continued prescriptions and understands that medication refills will not be called in.  patient is interested in GLP1 medications, patient denies any personal or family history of MTC or in patients with Multiple Endocrine Neoplasia syndrome type 2 (MEN 2). Counseled patient regarding the potential risk for MTC with the use of semaglutide and inform them of symptoms of thyroid tumors (eg, a mass in the neck, dysphagia, dyspnea, persistent hoarseness).       Plan:  - continue semaglutide 0.5 mg weekly  - Medication will be used with a reduced calorie diet and increased physical activity in the management of exogenous obesity       Regarding Obesity:  Follow up with dietitian and psychologist as recommended.  Discussed the role of sleep and stress in weight management.  Labs orders as above.  Counseled on comprehensive weight loss plan including  attention to nutrition, exercise and behavior/stress management for success. See patient instruction below for more details.  Weight Loss Consent to treat reviewed and signed.    Regarding weight loss Medications.  Medication use and side effects reviewed with patient.    Medication will be used with a reduced calorie diet and increased physical activity in the management of exogenous obesity.  Patient will be responsible to let me know of any side effects or complications with medications.               Return in about 3 months (around 5/11/2025).     Jessica Reinoso MD

## 2025-02-11 NOTE — TELEPHONE ENCOUNTER
Action Requested: Summary for Provider     []  Critical Lab, Recommendations Needed  [] Need Additional Advice  []   FYI    []   Need Orders  [] Need Medications Sent to Pharmacy  []  Other     SUMMARY: Disposition per protocol  is to be seen in office within 3 days.  Patient is only available today. Dr Chni has no office visits open today, patient accepts first available [facility]  provider:  Future Appointments   Date Time Provider Department Center   2/11/2025  2:00 PM Judith Harris APRN [facility]   3/12/2025 10:20 AM [facility]     Reason for call: Hypotension  Onset: 2 weeks    Patient calling,verified name and date of birth.    She has been feeling dizziness  and higher pule rate with standing for the past 2 weeks. Occasionally she a feeling of shortness  of breath when she is walking around at work. She is taking Lisinopril 40 mg daily as prescribed.  She reports drinking  at least 60 ounces of water daily. She is not monitoring BP at home but coworkers have checked it at work recently and noted that pulse rate increases with standing.  BP at provider's appointment today was 112/78 and pulse 101. Reviewed care advice to keep appointment today as scheduled, bring BP monitor to apointment for calibration, maintain hydration, change positions slowly, call back if dizziness or low blood pressure occurs. Patient verbalizes understanding and agrees to plan of care.      Reason for Disposition   Brief dizziness or lightheadedness after standing up or eating    Protocols used: Low Blood Pressure-A-OH

## 2025-02-11 NOTE — PROGRESS NOTES
HPI:    Patient ID: Ruth Garcia is a 29 year old female.    HPI HTN  29 year old female is taking Lisinopril 40mg,  She has been dizzy at work.  She is following with  and had lost weight    Wt Readings from Last 6 Encounters:   02/11/25 188 lb 6.4 oz (85.5 kg)   02/11/25 187 lb (84.8 kg)   12/05/24 197 lb (89.4 kg)   11/04/24 208 lb 9.6 oz (94.6 kg)   10/23/24 205 lb (93 kg)   09/11/24 203 lb (92.1 kg)      HTN  Blood pressure 110/70, pulse 99, height 5' 5.4\" (1.661 m), weight 188 lb 6.4 oz (85.5 kg), last menstrual period 05/06/2024, not currently breastfeeding.     Immunization History   Administered Date(s) Administered    Covid-19 Vaccine Pfizer 30 mcg/0.3 ml 12/21/2020, 01/12/2021    FLUZONE 6 months and older PFS 0.5 ml (14625) 03/19/2018, 10/12/2023    Hpv Virus Vaccine 9 [facility] 05/01/2023    Influenza 11/10/2016, 09/22/2020, 10/31/2022, 10/06/2023    TDAP 08/10/2010, 12/12/2023    Varicella Vaccine 08/31/2019       Past Medical History:    Fibromyalgia      Past Surgical History:   Procedure Laterality Date    Other surgical history Right     arthroscopic hip surgery for torn labrum      Social History     Socioeconomic History    Marital status: Single   Tobacco Use    Smoking status: Never    Smokeless tobacco: Never   Vaping Use    Vaping status: Never Used   Substance and Sexual Activity    Alcohol use: Yes    Drug use: No    Sexual activity: Yes     Birth control/protection: OCP          Review of Systems   Constitutional:  Negative for chills, fatigue and fever.   HENT:  Negative for congestion, ear discharge, ear pain, facial swelling, hearing loss, postnasal drip, sinus pressure, sore throat and trouble swallowing.    Eyes:  Negative for pain, discharge, redness and visual disturbance.   Respiratory:  Negative for cough, chest tightness, shortness of breath and wheezing.    Cardiovascular:  Negative for chest pain, palpitations and leg swelling.   Gastrointestinal:  Negative for  abdominal distention, abdominal pain, constipation, diarrhea, nausea and vomiting.   Endocrine: Negative for cold intolerance, heat intolerance, polydipsia, polyphagia and polyuria.   Genitourinary:  Negative for difficulty urinating, dysuria, pelvic pain and vaginal bleeding.   Musculoskeletal:  Negative for back pain, gait problem, neck pain and neck stiffness.   Skin:  Negative for color change and rash.   Neurological:  Negative for dizziness, seizures, weakness and headaches.   Psychiatric/Behavioral:  Negative for agitation and sleep disturbance. The patient is not nervous/anxious.               Current Outpatient Medications   Medication Sig Dispense Refill    lisinopril 10 MG Oral Tab Take 1 tablet (10 mg total) by mouth daily. 30 tablet 2    ondansetron (ZOFRAN) 4 mg tablet Take 1 tablet (4 mg total) by mouth every 8 (eight) hours as needed for Nausea. 20 tablet 2    CUSTOM MEDICATION Semaglutide/cyanocobalamin injection (1 mg weekly)  Concentration: 5 mg/ 0.5 mL   Amount: 5 ML   Dispense: needles.  Instructions: Injection 0.10 mL/ weekly subcutaneous 2 each 0    Drospirenone (SLYND) 4 MG Oral Tab Take 1 tablet by mouth daily. 84 tablet 4    gabapentin 300 MG Oral Cap Take 1 capsule (300 mg total) by mouth in the morning and 1 capsule (300 mg total) before bedtime. 180 capsule 1    Cholecalciferol (VITAMIN D) 50 MCG (2000 UT) Oral Tab Take by mouth.      nitroglycerin 0.4 MG Sublingual SL Tab Place 1 tablet (0.4 mg total) under the tongue every 5 (five) minutes as needed for Chest pain. May repeat two times. If not better then head to ER 30 tablet 1    triamcinolone acetonide 0.1 % External Ointment Apply thin layer to affected skin of bilateral arms nightly. Do not apply to face, underarms, groin or to normal skin. 80 g 0    Magnesium 500 MG Oral Cap Take by mouth. (Patient not taking: Reported on 2/11/2025)       Allergies:Allergies[1]   PHYSICAL EXAM:   Physical Exam  Constitutional:       Appearance:  Normal appearance. She is well-developed.   HENT:      Head: Normocephalic.      Right Ear: Tympanic membrane normal.      Left Ear: Tympanic membrane normal.      Nose: Nose normal.      Mouth/Throat:      Mouth: Mucous membranes are moist.      Pharynx: No oropharyngeal exudate or posterior oropharyngeal erythema.   Eyes:      General:         Right eye: No discharge.         Left eye: No discharge.      Pupils: Pupils are equal, round, and reactive to light.   Cardiovascular:      Rate and Rhythm: Normal rate and regular rhythm.      Heart sounds: Normal heart sounds. No murmur heard.     No friction rub. No gallop.   Pulmonary:      Effort: Pulmonary effort is normal. No respiratory distress.      Breath sounds: Normal breath sounds. No wheezing, rhonchi or rales.   Abdominal:      General: Bowel sounds are normal. There is no distension.      Palpations: Abdomen is soft. There is no mass.      Tenderness: There is no abdominal tenderness. There is no right CVA tenderness, left CVA tenderness or guarding.   Musculoskeletal:         General: No tenderness.      Cervical back: Normal range of motion and neck supple. No tenderness.      Right lower leg: No edema.      Left lower leg: No edema.   Lymphadenopathy:      Cervical: No cervical adenopathy.   Skin:     General: Skin is warm and dry.      Findings: No rash.   Neurological:      Mental Status: She is alert and oriented to person, place, and time.      Coordination: Coordination normal.      Gait: Gait normal.   Psychiatric:         Mood and Affect: Mood normal.         Behavior: Behavior normal.         Thought Content: Thought content normal.         Judgment: Judgment normal.       /70   Pulse 99   Ht 5' 5.4\" (1.661 m)   Wt 188 lb 6.4 oz (85.5 kg)   LMP 05/06/2024 (Approximate)   BMI 30.97 kg/m²   Wt Readings from Last 2 Encounters:   02/11/25 188 lb 6.4 oz (85.5 kg)   02/11/25 187 lb (84.8 kg)     Body mass index is 30.97 kg/m².(2)  Lab  Results   Component Value Date    WBC 6.2 08/24/2024    RBC 4.02 08/24/2024    HGB 12.8 08/24/2024    HCT 38.4 08/24/2024    MCV 95.5 08/24/2024    MCH 31.8 08/24/2024    MCHC 33.3 08/24/2024    RDW 12.7 08/24/2024    .0 08/24/2024      Lab Results   Component Value Date    GLU 91 08/24/2024    BUN 15 08/24/2024    BUNCREA 18.3 08/24/2024    CREATSERUM 0.82 08/24/2024    ANIONGAP 7 08/24/2024    CA 9.6 08/24/2024    OSMOCALC 294 08/24/2024    ALKPHO 58 08/24/2024    AST 15 08/24/2024    ALT 11 08/24/2024    BILT 1.1 08/24/2024    TP 6.9 08/24/2024    ALB 4.3 08/24/2024    GLOBULIN 2.6 08/24/2024     08/24/2024    K 4.2 08/24/2024     08/24/2024    CO2 24.0 08/24/2024      Lab Results   Component Value Date    EAG 82 08/24/2024    A1C 4.5 08/24/2024      Lab Results   Component Value Date    CHOLEST 179 08/24/2024    TRIG 57 08/24/2024    HDL 49 08/24/2024     (H) 08/24/2024    VLDL 10 08/24/2024    NONHDLC 130 (H) 08/24/2024      Lab Results   Component Value Date    T4F 1.2 08/24/2024    TSH 1.811 08/24/2024                ASSESSMENT/PLAN:     Problem List Items Addressed This Visit       Hypertension - Primary     Blood pressure 110/70, pulse 99, height 5' 5.4\" (1.661 m), weight 188 lb 6.4 oz (85.5 kg), last menstrual period 05/06/2024, not currently breastfeeding.     Patient has lost weight and her blood pressure has been running lower.    She has been Dizzy at work.    Plan  Magnesium Glycinate- 250 mg- 500mg  Monday, Wednesday, Friday- Night  Decrease Lisinopril to 10mg every morning.  She will monitor her blood pressure and return in 1 month.    Foods with Magnesium  Brown Rice, avocados, spinach, flaco, oatmeal, navy beans, lima beans, broccoli, yogurt, banannas, baked potatoes, apples, apricots, ling's yeast, tofu, cantaloupes, grapefruit, green leafy vegetables, marcelo, nuts, salmon, and wheat.     Low Salt Diet    Follow up in one Month         Relevant Medications     lisinopril 10 MG Oral Tab          No orders of the defined types were placed in this encounter.      Meds This Visit:  Requested Prescriptions     Signed Prescriptions Disp Refills    lisinopril 10 MG Oral Tab 30 tablet 2     Sig: Take 1 tablet (10 mg total) by mouth daily.       Imaging & Referrals:  None         CODY Ryan          [1]   Allergies  Allergen Reactions    Seasonal HIVES

## (undated) NOTE — LETTER
December 22, 2022     [facility] [address] [place] [address]      Dear Nalini Trellie:    Below are the results from your recent visit:     Screening test for diabetes is negative    Resulted Orders   COMP METABOLIC PANEL (14)   Result Value Ref Range    Glucose 88 70 - 99 mg/dL    Sodium 141 136 - 145 mmol/L    Potassium 3.8 3.5 - 5.1 mmol/L    Chloride 109 98 - 112 mmol/L    CO2 26.0 21.0 - 32.0 mmol/L    Anion Gap 6 0 - 18 mmol/L    BUN 11 7 - 18 mg/dL    Creatinine 0.83 0.55 - 1.02 mg/dL    BUN/CREA Ratio 13.3 10.0 - 20.0    Calcium, Total 9.0 8.5 - 10.1 mg/dL    Calculated Osmolality 291 275 - 295 mOsm/kg    eGFR-Cr 100 >=60 mL/min/1.73m2    ALT 19 13 - 56 U/L    AST 8 (L) 15 - 37 U/L    Alkaline Phosphatase 42 37 - 98 U/L    Bilirubin, Total 1.0 0.1 - 2.0 mg/dL    Total Protein 7.1 6.4 - 8.2 g/dL    Albumin 3.6 3.4 - 5.0 g/dL    Globulin  3.5 2.8 - 4.4 g/dL    A/G Ratio 1.0 1.0 - 2.0    Patient Fasting for CMP? Yes    HEMOGLOBIN A1C   Result Value Ref Range    HgbA1C 4.8 <5.7 %    Estimated Average Glucose 91 68 - 126 mg/dL   LIPID PANEL   Result Value Ref Range    Cholesterol, Total 174 <200 mg/dL    HDL Cholesterol 51 40 - 59 mg/dL    Triglycerides 152 (H) 30 - 149 mg/dL    LDL Cholesterol 97 <100 mg/dL    VLDL 25 0 - 30 mg/dL    Non HDL Chol 123 <130 mg/dL    Patient Fasting for Lipid?  Yes    TSH W REFLEX TO FREE T4   Result Value Ref Range    TSH 1.740 0.358 - 3.740 mIU/mL   VITAMIN D, 25-HYDROXY   Result Value Ref Range    Vitamin D, 25OH, Total 42.4 30.0 - 100.0 ng/mL   HCV ANTIBODY   Result Value Ref Range    Hepatitis C Virus Nonreactive Nonreactive    PROTHROMBIN TIME (PT)   Result Value Ref Range    PT 13.0 11.6 - 14.8 seconds    INR 0.99 0.85 - 1.16   PTT, ACTIVATED   Result Value Ref Range    PTT 26.5 23.3 - 35.6 seconds   CBC W/ DIFFERENTIAL   Result Value Ref Range    WBC 6.1 4.0 - 11.0 x10(3) uL    RBC 4.68 3.80 - 5.30 x10(6)uL    HGB 14.6 12.0 - 16.0 g/dL    HCT 43.1 35.0 - 48.0 %    MCV 92.1 80.0 - 100.0 fL    MCH 31.2 26.0 - 34.0 pg    MCHC 33.9 31.0 - 37.0 g/dL    RDW-SD 38.7 35.1 - 46.3 fL    RDW 11.5 11.0 - 15.0 %    .0 150.0 - 450.0 10(3)uL    Neutrophil Absolute Prelim 3.65 1.50 - 7.70 x10 (3) uL    Neutrophil Absolute 3.65 1.50 - 7.70 x10(3) uL    Lymphocyte Absolute 1.89 1.00 - 4.00 x10(3) uL    Monocyte Absolute 0.39 0.10 - 1.00 x10(3) uL    Eosinophil Absolute 0.07 0.00 - 0.70 x10(3) uL    Basophil Absolute 0.06 0.00 - 0.20 x10(3) uL    Immature Granulocyte Absolute 0.02 0.00 - 1.00 x10(3) uL    Neutrophil % 60.0 %    Lymphocyte % 31.1 %    Monocyte % 6.4 %    Eosinophil % 1.2 %    Basophil % 1.0 %    Immature Granulocyte % 0.3 %     If you have any questions or concerns, please don't hesitate to call.     Sonia Caed MD